# Patient Record
Sex: FEMALE | Race: WHITE | NOT HISPANIC OR LATINO | Employment: FULL TIME
[De-identification: names, ages, dates, MRNs, and addresses within clinical notes are randomized per-mention and may not be internally consistent; named-entity substitution may affect disease eponyms.]

---

## 2017-11-17 ENCOUNTER — HOSPITAL (OUTPATIENT)
Dept: OTHER | Age: 32
End: 2017-11-17

## 2018-11-17 ENCOUNTER — IMAGING SERVICES (OUTPATIENT)
Dept: OTHER | Age: 33
End: 2018-11-17

## 2018-11-17 ENCOUNTER — HOSPITAL (OUTPATIENT)
Dept: OTHER | Age: 33
End: 2018-11-17

## 2019-12-30 ENCOUNTER — APPOINTMENT (OUTPATIENT)
Dept: GENERAL RADIOLOGY | Age: 34
End: 2019-12-30
Attending: EMERGENCY MEDICINE
Payer: COMMERCIAL

## 2019-12-30 ENCOUNTER — HOSPITAL ENCOUNTER (OUTPATIENT)
Age: 34
Discharge: HOME | End: 2019-12-30
Attending: EMERGENCY MEDICINE
Payer: COMMERCIAL

## 2019-12-30 VITALS
SYSTOLIC BLOOD PRESSURE: 139 MMHG | RESPIRATION RATE: 19 BRPM | OXYGEN SATURATION: 99 % | TEMPERATURE: 99 F | DIASTOLIC BLOOD PRESSURE: 97 MMHG | HEART RATE: 103 BPM
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
## 2019-12-30 PROCEDURE — 73522 X-RAY EXAM HIPS BI 3-4 VIEWS: CPT | Performed by: EMERGENCY MEDICINE

## 2019-12-30 PROCEDURE — 73030 X-RAY EXAM OF SHOULDER: CPT | Performed by: EMERGENCY MEDICINE

## 2019-12-30 PROCEDURE — 99204 OFFICE O/P NEW MOD 45 MIN: CPT

## 2019-12-30 RX ORDER — HYDROCODONE BITARTRATE AND ACETAMINOPHEN 5; 325 MG/1; MG/1
1 TABLET ORAL EVERY 6 HOURS PRN
Qty: 10 TABLET | Refills: 0 | Status: SHIPPED | OUTPATIENT
Start: 2019-12-30 | End: 2020-01-06

## 2019-12-30 RX ORDER — DIAZEPAM 2 MG/1
2 TABLET ORAL ONCE
Status: DISCONTINUED | OUTPATIENT
Start: 2019-12-30 | End: 2019-12-30

## 2019-12-30 RX ORDER — LIDOCAINE 50 MG/G
1 PATCH TOPICAL EVERY 24 HOURS
Qty: 5 PATCH | Refills: 0 | Status: SHIPPED | OUTPATIENT
Start: 2019-12-30

## 2019-12-30 RX ORDER — HYDROCODONE BITARTRATE AND ACETAMINOPHEN 5; 325 MG/1; MG/1
1 TABLET ORAL ONCE
Status: COMPLETED | OUTPATIENT
Start: 2019-12-30 | End: 2019-12-30

## 2019-12-30 RX ORDER — DIAZEPAM 5 MG/1
2.5 TABLET ORAL ONCE
Status: COMPLETED | OUTPATIENT
Start: 2019-12-30 | End: 2019-12-30
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
## 2020-06-12 ENCOUNTER — HOSPITAL (OUTPATIENT)
Dept: OTHER | Age: 35
End: 2020-06-12
Attending: OBSTETRICS & GYNECOLOGY

## 2021-08-30 ENCOUNTER — OFFICE VISIT (OUTPATIENT)
Dept: URGENT CARE | Age: 36
End: 2021-08-30

## 2021-08-30 DIAGNOSIS — Z01.812 ENCOUNTER FOR SCREENING LABORATORY TESTING FOR COVID-19 VIRUS IN ASYMPTOMATIC PATIENT: Primary | ICD-10-CM

## 2021-08-30 DIAGNOSIS — Z11.52 ENCOUNTER FOR SCREENING LABORATORY TESTING FOR COVID-19 VIRUS IN ASYMPTOMATIC PATIENT: Primary | ICD-10-CM

## 2021-08-30 PROCEDURE — X1094 NO CHARGE VISIT: HCPCS | Performed by: NURSE PRACTITIONER

## 2021-08-30 PROCEDURE — U0003 INFECTIOUS AGENT DETECTION BY NUCLEIC ACID (DNA OR RNA); SEVERE ACUTE RESPIRATORY SYNDROME CORONAVIRUS 2 (SARS-COV-2) (CORONAVIRUS DISEASE [COVID-19]), AMPLIFIED PROBE TECHNIQUE, MAKING USE OF HIGH THROUGHPUT TECHNOLOGIES AS DESCRIBED BY CMS-2020-01-R: HCPCS | Performed by: PSYCHIATRY & NEUROLOGY

## 2021-08-30 PROCEDURE — U0005 INFEC AGEN DETEC AMPLI PROBE: HCPCS | Performed by: PSYCHIATRY & NEUROLOGY

## 2021-08-31 LAB
SARS-COV-2 RNA RESP QL NAA+PROBE: NOT DETECTED
SERVICE CMNT-IMP: NORMAL
SERVICE CMNT-IMP: NORMAL

## 2021-09-09 ENCOUNTER — OFFICE VISIT (OUTPATIENT)
Dept: URGENT CARE | Age: 36
End: 2021-09-09

## 2021-09-09 DIAGNOSIS — Z11.52 ENCOUNTER FOR SCREENING LABORATORY TESTING FOR COVID-19 VIRUS IN ASYMPTOMATIC PATIENT: Primary | ICD-10-CM

## 2021-09-09 DIAGNOSIS — Z01.812 ENCOUNTER FOR SCREENING LABORATORY TESTING FOR COVID-19 VIRUS IN ASYMPTOMATIC PATIENT: Primary | ICD-10-CM

## 2021-09-09 PROCEDURE — U0005 INFEC AGEN DETEC AMPLI PROBE: HCPCS | Performed by: INTERNAL MEDICINE

## 2021-09-09 PROCEDURE — X1094 NO CHARGE VISIT: HCPCS | Performed by: NURSE PRACTITIONER

## 2021-09-09 PROCEDURE — U0003 INFECTIOUS AGENT DETECTION BY NUCLEIC ACID (DNA OR RNA); SEVERE ACUTE RESPIRATORY SYNDROME CORONAVIRUS 2 (SARS-COV-2) (CORONAVIRUS DISEASE [COVID-19]), AMPLIFIED PROBE TECHNIQUE, MAKING USE OF HIGH THROUGHPUT TECHNOLOGIES AS DESCRIBED BY CMS-2020-01-R: HCPCS | Performed by: INTERNAL MEDICINE

## 2021-09-10 LAB
SARS-COV-2 RNA RESP QL NAA+PROBE: NOT DETECTED
SERVICE CMNT-IMP: NORMAL
SERVICE CMNT-IMP: NORMAL

## 2021-09-16 ENCOUNTER — LAB SERVICES (OUTPATIENT)
Dept: URGENT CARE | Age: 36
End: 2021-09-16

## 2021-09-16 DIAGNOSIS — Z11.52 ENCOUNTER FOR SCREENING LABORATORY TESTING FOR COVID-19 VIRUS IN ASYMPTOMATIC PATIENT: Primary | ICD-10-CM

## 2021-09-16 DIAGNOSIS — Z01.812 ENCOUNTER FOR SCREENING LABORATORY TESTING FOR COVID-19 VIRUS IN ASYMPTOMATIC PATIENT: Primary | ICD-10-CM

## 2021-09-16 PROCEDURE — U0005 INFEC AGEN DETEC AMPLI PROBE: HCPCS | Performed by: INTERNAL MEDICINE

## 2021-09-16 PROCEDURE — U0003 INFECTIOUS AGENT DETECTION BY NUCLEIC ACID (DNA OR RNA); SEVERE ACUTE RESPIRATORY SYNDROME CORONAVIRUS 2 (SARS-COV-2) (CORONAVIRUS DISEASE [COVID-19]), AMPLIFIED PROBE TECHNIQUE, MAKING USE OF HIGH THROUGHPUT TECHNOLOGIES AS DESCRIBED BY CMS-2020-01-R: HCPCS | Performed by: INTERNAL MEDICINE

## 2021-09-17 LAB
SARS-COV-2 RNA RESP QL NAA+PROBE: NOT DETECTED
SERVICE CMNT-IMP: NORMAL
SERVICE CMNT-IMP: NORMAL

## 2021-09-23 ENCOUNTER — LAB SERVICES (OUTPATIENT)
Dept: URGENT CARE | Age: 36
End: 2021-09-23

## 2021-09-23 DIAGNOSIS — Z11.52 ENCOUNTER FOR SCREENING LABORATORY TESTING FOR COVID-19 VIRUS IN ASYMPTOMATIC PATIENT: Primary | ICD-10-CM

## 2021-09-23 DIAGNOSIS — Z01.812 ENCOUNTER FOR SCREENING LABORATORY TESTING FOR COVID-19 VIRUS IN ASYMPTOMATIC PATIENT: Primary | ICD-10-CM

## 2021-09-23 PROCEDURE — U0005 INFEC AGEN DETEC AMPLI PROBE: HCPCS | Performed by: INTERNAL MEDICINE

## 2021-09-23 PROCEDURE — U0003 INFECTIOUS AGENT DETECTION BY NUCLEIC ACID (DNA OR RNA); SEVERE ACUTE RESPIRATORY SYNDROME CORONAVIRUS 2 (SARS-COV-2) (CORONAVIRUS DISEASE [COVID-19]), AMPLIFIED PROBE TECHNIQUE, MAKING USE OF HIGH THROUGHPUT TECHNOLOGIES AS DESCRIBED BY CMS-2020-01-R: HCPCS | Performed by: INTERNAL MEDICINE

## 2021-09-24 LAB
SARS-COV-2 RNA RESP QL NAA+PROBE: NOT DETECTED
SERVICE CMNT-IMP: NORMAL
SERVICE CMNT-IMP: NORMAL

## 2021-11-12 ENCOUNTER — APPOINTMENT (OUTPATIENT)
Dept: MAMMOGRAPHY | Age: 36
End: 2021-11-12
Attending: OBSTETRICS & GYNECOLOGY

## 2022-03-25 ENCOUNTER — HOSPITAL ENCOUNTER (OUTPATIENT)
Dept: MAMMOGRAPHY | Age: 37
Discharge: HOME | End: 2022-03-25
Attending: OBSTETRICS & GYNECOLOGY

## 2022-03-25 DIAGNOSIS — Z12.31 SCREENING MAMMOGRAM, ENCOUNTER FOR: ICD-10-CM

## 2022-03-25 DIAGNOSIS — R92.2 DENSE BREASTS: ICD-10-CM

## 2022-03-25 DIAGNOSIS — R92.30 DENSE BREASTS: ICD-10-CM

## 2022-03-25 PROCEDURE — 76641 ULTRASOUND BREAST COMPLETE: CPT

## 2022-03-25 PROCEDURE — 77067 SCR MAMMO BI INCL CAD: CPT

## 2022-06-28 ENCOUNTER — WALK IN (OUTPATIENT)
Dept: URGENT CARE | Age: 37
End: 2022-06-28

## 2022-06-28 VITALS
WEIGHT: 213 LBS | OXYGEN SATURATION: 100 % | RESPIRATION RATE: 18 BRPM | HEART RATE: 105 BPM | DIASTOLIC BLOOD PRESSURE: 86 MMHG | TEMPERATURE: 99 F | SYSTOLIC BLOOD PRESSURE: 136 MMHG | BODY MASS INDEX: 41.82 KG/M2 | HEIGHT: 60 IN

## 2022-06-28 DIAGNOSIS — J02.0 PHARYNGITIS, STREPTOCOCCAL, ACUTE: Primary | ICD-10-CM

## 2022-06-28 LAB
INTERNAL PROCEDURAL CONTROLS ACCEPTABLE: YES
INTERNAL PROCEDURAL CONTROLS ACCEPTABLE: YES
S PYO AG THROAT QL IA.RAPID: POSITIVE
SARS-COV+SARS-COV-2 AG RESP QL IA.RAPID: NOT DETECTED

## 2022-06-28 PROCEDURE — 99213 OFFICE O/P EST LOW 20 MIN: CPT | Performed by: NURSE PRACTITIONER

## 2022-06-28 PROCEDURE — 87880 STREP A ASSAY W/OPTIC: CPT | Performed by: NURSE PRACTITIONER

## 2022-06-28 PROCEDURE — 87426 SARSCOV CORONAVIRUS AG IA: CPT | Performed by: NURSE PRACTITIONER

## 2022-06-28 RX ORDER — SEMAGLUTIDE 1.34 MG/ML
0.5 INJECTION, SOLUTION SUBCUTANEOUS
Status: ON HOLD | COMMUNITY
Start: 2022-05-04 | End: 2023-03-02

## 2022-06-28 RX ORDER — AMOXICILLIN 500 MG/1
500 CAPSULE ORAL 2 TIMES DAILY
Qty: 20 CAPSULE | Refills: 0 | Status: SHIPPED | OUTPATIENT
Start: 2022-06-28 | End: 2022-07-08

## 2022-06-28 RX ORDER — METRONIDAZOLE 7.5 MG/G
GEL VAGINAL
Status: ON HOLD | COMMUNITY
Start: 2022-03-24 | End: 2023-03-02

## 2022-06-28 RX ORDER — ATENOLOL 25 MG/1
25 TABLET ORAL DAILY
Status: ON HOLD | COMMUNITY
Start: 2022-03-22 | End: 2023-03-02

## 2023-03-02 ENCOUNTER — APPOINTMENT (OUTPATIENT)
Dept: MRI IMAGING | Age: 38
DRG: 444 | End: 2023-03-02
Attending: INTERNAL MEDICINE

## 2023-03-02 ENCOUNTER — HOSPITAL ENCOUNTER (INPATIENT)
Age: 38
LOS: 3 days | Discharge: HOME | DRG: 444 | End: 2023-03-05
Attending: EMERGENCY MEDICINE | Admitting: INTERNAL MEDICINE

## 2023-03-02 ENCOUNTER — APPOINTMENT (OUTPATIENT)
Dept: ULTRASOUND IMAGING | Age: 38
DRG: 444 | End: 2023-03-02
Attending: PHYSICIAN ASSISTANT

## 2023-03-02 DIAGNOSIS — K80.50 CHOLEDOCHOLITHIASIS: Primary | ICD-10-CM

## 2023-03-02 LAB
ALBUMIN SERPL-MCNC: 3.9 G/DL (ref 3.6–5.1)
ALBUMIN/GLOB SERPL: 0.9 {RATIO} (ref 1–2.4)
ALP SERPL-CCNC: 239 UNITS/L (ref 45–117)
ALT SERPL-CCNC: 503 UNITS/L
AMORPH SED URNS QL MICRO: PRESENT
ANION GAP SERPL CALC-SCNC: 5 MMOL/L (ref 7–19)
APPEARANCE UR: ABNORMAL
AST SERPL-CCNC: 741 UNITS/L
B-HCG UR QL: NEGATIVE
BACTERIA #/AREA URNS HPF: ABNORMAL /HPF
BASOPHILS # BLD: 0.1 K/MCL (ref 0–0.3)
BASOPHILS NFR BLD: 1 %
BILIRUB SERPL-MCNC: 0.9 MG/DL (ref 0.2–1)
BILIRUB UR QL STRIP: NEGATIVE
BUN SERPL-MCNC: 16 MG/DL (ref 6–20)
BUN/CREAT SERPL: 23 (ref 7–25)
CALCIUM SERPL-MCNC: 9.6 MG/DL (ref 8.4–10.2)
CHLORIDE SERPL-SCNC: 108 MMOL/L (ref 97–110)
CO2 SERPL-SCNC: 28 MMOL/L (ref 21–32)
COLOR UR: YELLOW
CREAT SERPL-MCNC: 0.7 MG/DL (ref 0.51–0.95)
DEPRECATED RDW RBC: 40.2 FL (ref 39–50)
EOSINOPHIL # BLD: 0.1 K/MCL (ref 0–0.5)
EOSINOPHIL NFR BLD: 2 %
ERYTHROCYTE [DISTWIDTH] IN BLOOD: 13.5 % (ref 11–15)
FASTING DURATION TIME PATIENT: ABNORMAL H
GFR SERPLBLD BASED ON 1.73 SQ M-ARVRAT: >90 ML/MIN
GLOBULIN SER-MCNC: 4.4 G/DL (ref 2–4)
GLUCOSE SERPL-MCNC: 129 MG/DL (ref 70–99)
GLUCOSE UR STRIP-MCNC: NEGATIVE MG/DL
HCT VFR BLD CALC: 42.7 % (ref 36–46.5)
HGB BLD-MCNC: 14 G/DL (ref 12–15.5)
HGB UR QL STRIP: NEGATIVE
HYALINE CASTS #/AREA URNS LPF: ABNORMAL /LPF
IMM GRANULOCYTES # BLD AUTO: 0 K/MCL (ref 0–0.2)
IMM GRANULOCYTES # BLD: 0 %
KETONES UR STRIP-MCNC: NEGATIVE MG/DL
LEUKOCYTE ESTERASE UR QL STRIP: NEGATIVE
LIPASE SERPL-CCNC: 286 UNITS/L (ref 73–393)
LYMPHOCYTES # BLD: 1.4 K/MCL (ref 1–4.8)
LYMPHOCYTES NFR BLD: 19 %
MCH RBC QN AUTO: 26.9 PG (ref 26–34)
MCHC RBC AUTO-ENTMCNC: 32.8 G/DL (ref 32–36.5)
MCV RBC AUTO: 82 FL (ref 78–100)
MONOCYTES # BLD: 0.5 K/MCL (ref 0.3–0.9)
MONOCYTES NFR BLD: 7 %
MUCOUS THREADS URNS QL MICRO: PRESENT
NEUTROPHILS # BLD: 5.4 K/MCL (ref 1.8–7.7)
NEUTROPHILS NFR BLD: 71 %
NITRITE UR QL STRIP: NEGATIVE
NRBC BLD MANUAL-RTO: 0 /100 WBC
PH UR STRIP: 7.5 [PH] (ref 5–7)
PLATELET # BLD AUTO: 280 K/MCL (ref 140–450)
POTASSIUM SERPL-SCNC: 3.9 MMOL/L (ref 3.4–5.1)
PROCALCITONIN SERPL IA-MCNC: <0.05 NG/ML
PROT SERPL-MCNC: 8.3 G/DL (ref 6.4–8.2)
PROT UR STRIP-MCNC: 100 MG/DL
RBC # BLD: 5.21 MIL/MCL (ref 4–5.2)
RBC #/AREA URNS HPF: ABNORMAL /HPF
SODIUM SERPL-SCNC: 137 MMOL/L (ref 135–145)
SP GR UR STRIP: 1.02 (ref 1–1.03)
SQUAMOUS #/AREA URNS HPF: ABNORMAL /HPF
TROPONIN I SERPL DL<=0.01 NG/ML-MCNC: 5 NG/L
UROBILINOGEN UR STRIP-MCNC: 0.2 MG/DL
WBC # BLD: 7.5 K/MCL (ref 4.2–11)
WBC #/AREA URNS HPF: ABNORMAL /HPF

## 2023-03-02 PROCEDURE — 80053 COMPREHEN METABOLIC PANEL: CPT | Performed by: EMERGENCY MEDICINE

## 2023-03-02 PROCEDURE — 99254 IP/OBS CNSLTJ NEW/EST MOD 60: CPT | Performed by: INTERNAL MEDICINE

## 2023-03-02 PROCEDURE — 96374 THER/PROPH/DIAG INJ IV PUSH: CPT

## 2023-03-02 PROCEDURE — 10002800 HB RX 250 W HCPCS: Performed by: INTERNAL MEDICINE

## 2023-03-02 PROCEDURE — 93010 ELECTROCARDIOGRAM REPORT: CPT | Performed by: INTERNAL MEDICINE

## 2023-03-02 PROCEDURE — 85025 COMPLETE CBC W/AUTO DIFF WBC: CPT | Performed by: EMERGENCY MEDICINE

## 2023-03-02 PROCEDURE — G1004 CDSM NDSC: HCPCS

## 2023-03-02 PROCEDURE — 74181 MRI ABDOMEN W/O CONTRAST: CPT

## 2023-03-02 PROCEDURE — 10002807 HB RX 258: Performed by: INTERNAL MEDICINE

## 2023-03-02 PROCEDURE — 10002800 HB RX 250 W HCPCS: Performed by: PHYSICIAN ASSISTANT

## 2023-03-02 PROCEDURE — 81025 URINE PREGNANCY TEST: CPT | Performed by: EMERGENCY MEDICINE

## 2023-03-02 PROCEDURE — 83690 ASSAY OF LIPASE: CPT | Performed by: EMERGENCY MEDICINE

## 2023-03-02 PROCEDURE — 10002800 HB RX 250 W HCPCS

## 2023-03-02 PROCEDURE — 84484 ASSAY OF TROPONIN QUANT: CPT | Performed by: EMERGENCY MEDICINE

## 2023-03-02 PROCEDURE — 76705 ECHO EXAM OF ABDOMEN: CPT

## 2023-03-02 PROCEDURE — 10002803 HB RX 637: Performed by: INTERNAL MEDICINE

## 2023-03-02 PROCEDURE — 10002807 HB RX 258: Performed by: PHYSICIAN ASSISTANT

## 2023-03-02 PROCEDURE — 10002800 HB RX 250 W HCPCS: Performed by: EMERGENCY MEDICINE

## 2023-03-02 PROCEDURE — 96361 HYDRATE IV INFUSION ADD-ON: CPT

## 2023-03-02 PROCEDURE — 81001 URINALYSIS AUTO W/SCOPE: CPT | Performed by: EMERGENCY MEDICINE

## 2023-03-02 PROCEDURE — 10000002 HB ROOM CHARGE MED SURG

## 2023-03-02 PROCEDURE — 10004651 HB RX, NO CHARGE ITEM: Performed by: INTERNAL MEDICINE

## 2023-03-02 PROCEDURE — 0FC98ZZ EXTIRPATION OF MATTER FROM COMMON BILE DUCT, VIA NATURAL OR ARTIFICIAL OPENING ENDOSCOPIC: ICD-10-PCS | Performed by: INTERNAL MEDICINE

## 2023-03-02 PROCEDURE — 99285 EMERGENCY DEPT VISIT HI MDM: CPT | Performed by: EMERGENCY MEDICINE

## 2023-03-02 PROCEDURE — 96375 TX/PRO/DX INJ NEW DRUG ADDON: CPT

## 2023-03-02 PROCEDURE — 84145 PROCALCITONIN (PCT): CPT | Performed by: INTERNAL MEDICINE

## 2023-03-02 PROCEDURE — 99285 EMERGENCY DEPT VISIT HI MDM: CPT

## 2023-03-02 PROCEDURE — 93005 ELECTROCARDIOGRAM TRACING: CPT | Performed by: EMERGENCY MEDICINE

## 2023-03-02 RX ORDER — MEDROXYPROGESTERONE ACETATE 150 MG/ML
150 INJECTION, SUSPENSION INTRAMUSCULAR
COMMUNITY

## 2023-03-02 RX ORDER — ACETAMINOPHEN 325 MG/1
650 TABLET ORAL EVERY 4 HOURS PRN
Status: DISCONTINUED | OUTPATIENT
Start: 2023-03-02 | End: 2023-03-05 | Stop reason: HOSPADM

## 2023-03-02 RX ORDER — ONDANSETRON 2 MG/ML
4 INJECTION INTRAMUSCULAR; INTRAVENOUS EVERY 8 HOURS PRN
Status: DISCONTINUED | OUTPATIENT
Start: 2023-03-02 | End: 2023-03-05 | Stop reason: HOSPADM

## 2023-03-02 RX ORDER — SODIUM CHLORIDE, SODIUM LACTATE, POTASSIUM CHLORIDE, CALCIUM CHLORIDE 600; 310; 30; 20 MG/100ML; MG/100ML; MG/100ML; MG/100ML
INJECTION, SOLUTION INTRAVENOUS CONTINUOUS
Status: DISCONTINUED | OUTPATIENT
Start: 2023-03-02 | End: 2023-03-05 | Stop reason: HOSPADM

## 2023-03-02 RX ORDER — ALPRAZOLAM 0.25 MG/1
0.5 TABLET ORAL
Status: COMPLETED | OUTPATIENT
Start: 2023-03-02 | End: 2023-03-02

## 2023-03-02 RX ORDER — KETOROLAC TROMETHAMINE 15 MG/ML
15 INJECTION, SOLUTION INTRAMUSCULAR; INTRAVENOUS EVERY 6 HOURS PRN
Status: DISCONTINUED | OUTPATIENT
Start: 2023-03-02 | End: 2023-03-05 | Stop reason: HOSPADM

## 2023-03-02 RX ORDER — ONDANSETRON 2 MG/ML
INJECTION INTRAMUSCULAR; INTRAVENOUS
Status: COMPLETED
Start: 2023-03-02 | End: 2023-03-02

## 2023-03-02 RX ORDER — KETOROLAC TROMETHAMINE 15 MG/ML
15 INJECTION, SOLUTION INTRAMUSCULAR; INTRAVENOUS ONCE
Status: COMPLETED | OUTPATIENT
Start: 2023-03-02 | End: 2023-03-02

## 2023-03-02 RX ORDER — PANTOPRAZOLE SODIUM 40 MG/10ML
40 INJECTION, POWDER, LYOPHILIZED, FOR SOLUTION INTRAVENOUS NIGHTLY
Status: DISCONTINUED | OUTPATIENT
Start: 2023-03-03 | End: 2023-03-05 | Stop reason: HOSPADM

## 2023-03-02 RX ORDER — 0.9 % SODIUM CHLORIDE 0.9 %
2 VIAL (ML) INJECTION EVERY 12 HOURS SCHEDULED
Status: DISCONTINUED | OUTPATIENT
Start: 2023-03-02 | End: 2023-03-05 | Stop reason: HOSPADM

## 2023-03-02 RX ORDER — ENOXAPARIN SODIUM 100 MG/ML
40 INJECTION SUBCUTANEOUS DAILY
Status: DISCONTINUED | OUTPATIENT
Start: 2023-03-02 | End: 2023-03-02

## 2023-03-02 RX ADMIN — ALPRAZOLAM 0.5 MG: 0.25 TABLET ORAL at 21:57

## 2023-03-02 RX ADMIN — ONDANSETRON 4 MG: 2 INJECTION INTRAMUSCULAR; INTRAVENOUS at 10:44

## 2023-03-02 RX ADMIN — ONDANSETRON 4 MG: 2 INJECTION INTRAMUSCULAR; INTRAVENOUS at 20:09

## 2023-03-02 RX ADMIN — KETOROLAC TROMETHAMINE 15 MG: 15 INJECTION, SOLUTION INTRAMUSCULAR; INTRAVENOUS at 10:41

## 2023-03-02 RX ADMIN — SODIUM CHLORIDE 1000 ML: 9 INJECTION, SOLUTION INTRAVENOUS at 10:44

## 2023-03-02 RX ADMIN — SODIUM CHLORIDE, PRESERVATIVE FREE 2 ML: 5 INJECTION INTRAVENOUS at 20:46

## 2023-03-02 RX ADMIN — SODIUM CHLORIDE, PRESERVATIVE FREE 2 ML: 5 INJECTION INTRAVENOUS at 15:04

## 2023-03-02 RX ADMIN — MORPHINE SULFATE 4 MG: 4 INJECTION, SOLUTION INTRAMUSCULAR; INTRAVENOUS at 12:16

## 2023-03-02 RX ADMIN — HYDROMORPHONE HYDROCHLORIDE 0.4 MG: 1 INJECTION, SOLUTION INTRAMUSCULAR; INTRAVENOUS; SUBCUTANEOUS at 17:12

## 2023-03-02 RX ADMIN — SODIUM CHLORIDE, POTASSIUM CHLORIDE, SODIUM LACTATE AND CALCIUM CHLORIDE: 600; 310; 30; 20 INJECTION, SOLUTION INTRAVENOUS at 18:37

## 2023-03-02 SDOH — ECONOMIC STABILITY: FOOD INSECURITY: HOW OFTEN IN THE PAST 12 MONTHS WERE YOU WORRIED OR STRESSED ABOUT HAVING ENOUGH MONEY TO BUY NUTRITIOUS MEALS?: USUALLY

## 2023-03-02 SDOH — HEALTH STABILITY: GENERAL
BECAUSE OF A PHYSICAL, MENTAL, OR EMOTIONAL CONDITION, DO YOU HAVE SERIOUS DIFFICULTY CONCENTRATING, REMEMBERING OR MAKING DECISIONS?: NO

## 2023-03-02 SDOH — ECONOMIC STABILITY: TRANSPORTATION INSECURITY
IN THE PAST 12 MONTHS, HAS LACK OF TRANSPORTATION KEPT YOU FROM MEETINGS, WORK, OR FROM GETTING THINGS NEEDED FOR DAILY LIVING?: NO

## 2023-03-02 SDOH — SOCIAL STABILITY: SOCIAL NETWORK
HOW OFTEN DO YOU SEE OR TALK TO PEOPLE THAT YOU CARE ABOUT AND FEEL CLOSE TO? (FOR EXAMPLE: TALKING TO FRIENDS ON THE PHONE, VISITING FRIENDS OR FAMILY, GOING TO CHURCH OR CLUB MEETINGS): 5 OR MORE TIMES A WEEK

## 2023-03-02 SDOH — ECONOMIC STABILITY: TRANSPORTATION INSECURITY
IN THE PAST 12 MONTHS, HAS THE LACK OF TRANSPORTATION KEPT YOU FROM MEDICAL APPOINTMENTS OR FROM GETTING MEDICATIONS?: NO

## 2023-03-02 SDOH — HEALTH STABILITY: GENERAL: BECAUSE OF A PHYSICAL, MENTAL, OR EMOTIONAL CONDITION, DO YOU HAVE DIFFICULTY DOING ERRANDS ALONE?: NO

## 2023-03-02 SDOH — HEALTH STABILITY: PHYSICAL HEALTH: DO YOU HAVE SERIOUS DIFFICULTY WALKING OR CLIMBING STAIRS?: NO

## 2023-03-02 SDOH — SOCIAL STABILITY: SOCIAL NETWORK: SUPPORT SYSTEMS: CHILDREN;PARENT;FAMILY MEMBERS

## 2023-03-02 SDOH — ECONOMIC STABILITY: HOUSING INSECURITY: WHAT IS YOUR LIVING SITUATION TODAY?: HOUSE

## 2023-03-02 SDOH — ECONOMIC STABILITY: HOUSING INSECURITY: ARE YOU WORRIED ABOUT LOSING YOUR HOUSING?: NO

## 2023-03-02 SDOH — ECONOMIC STABILITY: HOUSING INSECURITY: WHAT IS YOUR LIVING SITUATION TODAY?: CHILDREN

## 2023-03-02 SDOH — HEALTH STABILITY: PHYSICAL HEALTH: DO YOU HAVE DIFFICULTY DRESSING OR BATHING?: NO

## 2023-03-02 ASSESSMENT — PATIENT HEALTH QUESTIONNAIRE - PHQ9
2. FEELING DOWN, DEPRESSED OR HOPELESS: NOT AT ALL
SUM OF ALL RESPONSES TO PHQ9 QUESTIONS 1 AND 2: 0
1. LITTLE INTEREST OR PLEASURE IN DOING THINGS: NOT AT ALL
CLINICAL INTERPRETATION OF PHQ2 SCORE: NO FURTHER SCREENING NEEDED
IS PATIENT ABLE TO COMPLETE PHQ2 OR PHQ9: YES
SUM OF ALL RESPONSES TO PHQ9 QUESTIONS 1 AND 2: 0

## 2023-03-02 ASSESSMENT — COLUMBIA-SUICIDE SEVERITY RATING SCALE - C-SSRS
1. WITHIN THE PAST MONTH, HAVE YOU WISHED YOU WERE DEAD OR WISHED YOU COULD GO TO SLEEP AND NOT WAKE UP?: NO
IS THE PATIENT ABLE TO COMPLETE C-SSRS: YES
2. HAVE YOU ACTUALLY HAD ANY THOUGHTS OF KILLING YOURSELF?: NO
6. HAVE YOU EVER DONE ANYTHING, STARTED TO DO ANYTHING, OR PREPARED TO DO ANYTHING TO END YOUR LIFE?: NO

## 2023-03-02 ASSESSMENT — LIFESTYLE VARIABLES
AUDIT-C TOTAL SCORE: 0
HOW OFTEN DO YOU HAVE A DRINK CONTAINING ALCOHOL: NEVER
ALCOHOL_USE_STATUS: NO OR LOW RISK WITH VALIDATED TOOL
HOW OFTEN DO YOU HAVE 6 OR MORE DRINKS ON ONE OCCASION: NEVER
HOW MANY STANDARD DRINKS CONTAINING ALCOHOL DO YOU HAVE ON A TYPICAL DAY: 0,1 OR 2

## 2023-03-02 ASSESSMENT — ACTIVITIES OF DAILY LIVING (ADL)
ADL_BEFORE_ADMISSION: INDEPENDENT
RECENT_DECLINE_ADL: NO
ADL_SCORE: 12

## 2023-03-02 ASSESSMENT — PAIN SCALES - GENERAL
PAINLEVEL_OUTOF10: 5
PAINLEVEL_OUTOF10: 10
PAINLEVEL_OUTOF10: 4
PAINLEVEL_OUTOF10: 7
PAINLEVEL_OUTOF10: 7
PAINLEVEL_OUTOF10: 0

## 2023-03-03 ENCOUNTER — APPOINTMENT (OUTPATIENT)
Dept: GASTROENTEROLOGY | Age: 38
DRG: 444 | End: 2023-03-03
Attending: INTERNAL MEDICINE

## 2023-03-03 ENCOUNTER — ANESTHESIA (OUTPATIENT)
Dept: GASTROENTEROLOGY | Age: 38
DRG: 444 | End: 2023-03-03

## 2023-03-03 ENCOUNTER — ANESTHESIA EVENT (OUTPATIENT)
Dept: GASTROENTEROLOGY | Age: 38
DRG: 444 | End: 2023-03-03

## 2023-03-03 LAB
ALBUMIN SERPL-MCNC: 3.4 G/DL (ref 3.6–5.1)
ALBUMIN/GLOB SERPL: 0.9 {RATIO} (ref 1–2.4)
ALP SERPL-CCNC: 249 UNITS/L (ref 45–117)
ALT SERPL-CCNC: 1295 UNITS/L
AMYLASE SERPL-CCNC: 71 UNITS/L (ref 25–115)
ANION GAP SERPL CALC-SCNC: 8 MMOL/L (ref 7–19)
AST SERPL-CCNC: 888 UNITS/L
ATRIAL RATE (BPM): 96
BASOPHILS # BLD: 0 K/MCL (ref 0–0.3)
BASOPHILS NFR BLD: 1 %
BILIRUB SERPL-MCNC: 1.5 MG/DL (ref 0.2–1)
BUN SERPL-MCNC: 9 MG/DL (ref 6–20)
BUN/CREAT SERPL: 15 (ref 7–25)
CALCIUM SERPL-MCNC: 9.1 MG/DL (ref 8.4–10.2)
CHLORIDE SERPL-SCNC: 108 MMOL/L (ref 97–110)
CO2 SERPL-SCNC: 26 MMOL/L (ref 21–32)
CREAT SERPL-MCNC: 0.59 MG/DL (ref 0.51–0.95)
CRP SERPL-MCNC: 0.5 MG/DL
DEPRECATED RDW RBC: 41.1 FL (ref 39–50)
EOSINOPHIL # BLD: 0.1 K/MCL (ref 0–0.5)
EOSINOPHIL NFR BLD: 1 %
ERYTHROCYTE [DISTWIDTH] IN BLOOD: 13.6 % (ref 11–15)
FASTING DURATION TIME PATIENT: ABNORMAL H
GFR SERPLBLD BASED ON 1.73 SQ M-ARVRAT: >90 ML/MIN
GLOBULIN SER-MCNC: 4 G/DL (ref 2–4)
GLUCOSE SERPL-MCNC: 100 MG/DL (ref 70–99)
HCT VFR BLD CALC: 40.9 % (ref 36–46.5)
HGB BLD-MCNC: 13.2 G/DL (ref 12–15.5)
IMM GRANULOCYTES # BLD AUTO: 0 K/MCL (ref 0–0.2)
IMM GRANULOCYTES # BLD: 0 %
LIPASE SERPL-CCNC: 484 UNITS/L (ref 73–393)
LYMPHOCYTES # BLD: 1.5 K/MCL (ref 1–4.8)
LYMPHOCYTES NFR BLD: 19 %
MAGNESIUM SERPL-MCNC: 2.2 MG/DL (ref 1.7–2.4)
MCH RBC QN AUTO: 26.9 PG (ref 26–34)
MCHC RBC AUTO-ENTMCNC: 32.3 G/DL (ref 32–36.5)
MCV RBC AUTO: 83.5 FL (ref 78–100)
MONOCYTES # BLD: 0.6 K/MCL (ref 0.3–0.9)
MONOCYTES NFR BLD: 7 %
NEUTROPHILS # BLD: 5.7 K/MCL (ref 1.8–7.7)
NEUTROPHILS NFR BLD: 72 %
NRBC BLD MANUAL-RTO: 0 /100 WBC
P AXIS (DEGREES): 34
PHOSPHATE SERPL-MCNC: 3.6 MG/DL (ref 2.4–4.7)
PLATELET # BLD AUTO: 243 K/MCL (ref 140–450)
POTASSIUM SERPL-SCNC: 3.8 MMOL/L (ref 3.4–5.1)
PR-INTERVAL (MSEC): 104
PROT SERPL-MCNC: 7.4 G/DL (ref 6.4–8.2)
QRS-INTERVAL (MSEC): 87
QT-INTERVAL (MSEC): 328
QTC: 413
R AXIS (DEGREES): 88
RBC # BLD: 4.9 MIL/MCL (ref 4–5.2)
REPORT TEXT: NORMAL
SODIUM SERPL-SCNC: 138 MMOL/L (ref 135–145)
T AXIS (DEGREES): -18
VENTRICULAR RATE EKG/MIN (BPM): 95
WBC # BLD: 7.9 K/MCL (ref 4.2–11)

## 2023-03-03 PROCEDURE — 43262 ENDO CHOLANGIOPANCREATOGRAPH: CPT | Performed by: INTERNAL MEDICINE

## 2023-03-03 PROCEDURE — 85025 COMPLETE CBC W/AUTO DIFF WBC: CPT | Performed by: INTERNAL MEDICINE

## 2023-03-03 PROCEDURE — 13000001 HB PHASE II RECOVERY EA 30 MINUTES

## 2023-03-03 PROCEDURE — 82150 ASSAY OF AMYLASE: CPT | Performed by: SURGERY

## 2023-03-03 PROCEDURE — 84100 ASSAY OF PHOSPHORUS: CPT | Performed by: SURGERY

## 2023-03-03 PROCEDURE — 10002800 HB RX 250 W HCPCS: Performed by: ANESTHESIOLOGY

## 2023-03-03 PROCEDURE — 10004651 HB RX, NO CHARGE ITEM: Performed by: INTERNAL MEDICINE

## 2023-03-03 PROCEDURE — 10002800 HB RX 250 W HCPCS: Performed by: INTERNAL MEDICINE

## 2023-03-03 PROCEDURE — 10002807 HB RX 258: Performed by: INTERNAL MEDICINE

## 2023-03-03 PROCEDURE — C1769 GUIDE WIRE: HCPCS

## 2023-03-03 PROCEDURE — 10002805 HB CONTRAST AGENT: Performed by: INTERNAL MEDICINE

## 2023-03-03 PROCEDURE — C9113 INJ PANTOPRAZOLE SODIUM, VIA: HCPCS | Performed by: INTERNAL MEDICINE

## 2023-03-03 PROCEDURE — 13000029 HB GI MAJOR COMPLEX CASE EA ADD MINUTE

## 2023-03-03 PROCEDURE — 10006023 HB SUPPLY 272

## 2023-03-03 PROCEDURE — 10000002 HB ROOM CHARGE MED SURG

## 2023-03-03 PROCEDURE — 80053 COMPREHEN METABOLIC PANEL: CPT | Performed by: INTERNAL MEDICINE

## 2023-03-03 PROCEDURE — 99233 SBSQ HOSP IP/OBS HIGH 50: CPT | Performed by: INTERNAL MEDICINE

## 2023-03-03 PROCEDURE — 74328 X-RAY BILE DUCT ENDOSCOPY: CPT | Performed by: INTERNAL MEDICINE

## 2023-03-03 PROCEDURE — 13000028 HB GI MAJOR COMPLEX CASE S/U + 1ST 15 MIN

## 2023-03-03 PROCEDURE — 43264 ERCP REMOVE DUCT CALCULI: CPT | Performed by: INTERNAL MEDICINE

## 2023-03-03 PROCEDURE — 13000004 HB  ANESTHESIA  GENERAL OUTSIDE OR

## 2023-03-03 PROCEDURE — 83690 ASSAY OF LIPASE: CPT | Performed by: SURGERY

## 2023-03-03 PROCEDURE — 10002801 HB RX 250 W/O HCPCS: Performed by: ANESTHESIOLOGY

## 2023-03-03 PROCEDURE — 36415 COLL VENOUS BLD VENIPUNCTURE: CPT | Performed by: SURGERY

## 2023-03-03 PROCEDURE — 83735 ASSAY OF MAGNESIUM: CPT | Performed by: SURGERY

## 2023-03-03 PROCEDURE — 10002807 HB RX 258: Performed by: ANESTHESIOLOGY

## 2023-03-03 PROCEDURE — 86140 C-REACTIVE PROTEIN: CPT | Performed by: SURGERY

## 2023-03-03 RX ORDER — ONDANSETRON 2 MG/ML
INJECTION INTRAMUSCULAR; INTRAVENOUS PRN
Status: DISCONTINUED | OUTPATIENT
Start: 2023-03-03 | End: 2023-03-03

## 2023-03-03 RX ORDER — LIDOCAINE HYDROCHLORIDE 10 MG/ML
INJECTION, SOLUTION INFILTRATION; PERINEURAL PRN
Status: DISCONTINUED | OUTPATIENT
Start: 2023-03-03 | End: 2023-03-03

## 2023-03-03 RX ORDER — PROPOFOL 10 MG/ML
INJECTION, EMULSION INTRAVENOUS PRN
Status: DISCONTINUED | OUTPATIENT
Start: 2023-03-03 | End: 2023-03-03

## 2023-03-03 RX ORDER — SODIUM CHLORIDE, SODIUM LACTATE, POTASSIUM CHLORIDE, CALCIUM CHLORIDE 600; 310; 30; 20 MG/100ML; MG/100ML; MG/100ML; MG/100ML
INJECTION, SOLUTION INTRAVENOUS CONTINUOUS PRN
Status: DISCONTINUED | OUTPATIENT
Start: 2023-03-03 | End: 2023-03-03

## 2023-03-03 RX ORDER — SODIUM CHLORIDE, SODIUM LACTATE, POTASSIUM CHLORIDE, CALCIUM CHLORIDE 600; 310; 30; 20 MG/100ML; MG/100ML; MG/100ML; MG/100ML
INJECTION, SOLUTION INTRAVENOUS CONTINUOUS
Status: DISCONTINUED | OUTPATIENT
Start: 2023-03-03 | End: 2023-03-03

## 2023-03-03 RX ORDER — DEXAMETHASONE SODIUM PHOSPHATE 4 MG/ML
INJECTION, SOLUTION INTRA-ARTICULAR; INTRALESIONAL; INTRAMUSCULAR; INTRAVENOUS; SOFT TISSUE PRN
Status: DISCONTINUED | OUTPATIENT
Start: 2023-03-03 | End: 2023-03-03

## 2023-03-03 RX ADMIN — LIDOCAINE HYDROCHLORIDE 50 MG: 10 INJECTION, SOLUTION INFILTRATION; PERINEURAL at 16:22

## 2023-03-03 RX ADMIN — SODIUM CHLORIDE, POTASSIUM CHLORIDE, SODIUM LACTATE AND CALCIUM CHLORIDE: 600; 310; 30; 20 INJECTION, SOLUTION INTRAVENOUS at 20:50

## 2023-03-03 RX ADMIN — FENTANYL CITRATE 50 MCG: 50 INJECTION, SOLUTION INTRAMUSCULAR; INTRAVENOUS at 16:28

## 2023-03-03 RX ADMIN — IOHEXOL 9 ML: 350 INJECTION, SOLUTION INTRAVENOUS at 16:40

## 2023-03-03 RX ADMIN — SODIUM CHLORIDE, POTASSIUM CHLORIDE, SODIUM LACTATE AND CALCIUM CHLORIDE: 600; 310; 30; 20 INJECTION, SOLUTION INTRAVENOUS at 16:22

## 2023-03-03 RX ADMIN — SODIUM CHLORIDE, POTASSIUM CHLORIDE, SODIUM LACTATE AND CALCIUM CHLORIDE: 600; 310; 30; 20 INJECTION, SOLUTION INTRAVENOUS at 05:56

## 2023-03-03 RX ADMIN — PROPOFOL 300 MG: 10 INJECTION, EMULSION INTRAVENOUS at 16:22

## 2023-03-03 RX ADMIN — ACETAMINOPHEN 650 MG: 325 TABLET ORAL at 00:14

## 2023-03-03 RX ADMIN — PANTOPRAZOLE SODIUM 40 MG: 40 INJECTION, POWDER, FOR SOLUTION INTRAVENOUS at 20:47

## 2023-03-03 RX ADMIN — ONDANSETRON 4 MG: 2 INJECTION INTRAMUSCULAR; INTRAVENOUS at 16:48

## 2023-03-03 RX ADMIN — DEXAMETHASONE SODIUM PHOSPHATE 8 MG: 4 INJECTION, SOLUTION INTRAMUSCULAR; INTRAVENOUS at 16:22

## 2023-03-03 RX ADMIN — Medication 120 MG: at 16:22

## 2023-03-03 RX ADMIN — FENTANYL CITRATE 50 MCG: 50 INJECTION, SOLUTION INTRAMUSCULAR; INTRAVENOUS at 16:22

## 2023-03-03 ASSESSMENT — PAIN SCALES - GENERAL
PAINLEVEL_OUTOF10: 0

## 2023-03-03 ASSESSMENT — COGNITIVE AND FUNCTIONAL STATUS - GENERAL
DO YOU HAVE DIFFICULTY DRESSING OR BATHING: NO
DO YOU HAVE SERIOUS DIFFICULTY WALKING OR CLIMBING STAIRS: NO
BECAUSE OF A PHYSICAL, MENTAL, OR EMOTIONAL CONDITION, DO YOU HAVE SERIOUS DIFFICULTY CONCENTRATING, REMEMBERING OR MAKING DECISIONS: NO
BECAUSE OF A PHYSICAL, MENTAL, OR EMOTIONAL CONDITION, DO YOU HAVE DIFFICULTY DOING ERRANDS ALONE: NO

## 2023-03-04 LAB
ALBUMIN SERPL-MCNC: 3.4 G/DL (ref 3.6–5.1)
ALBUMIN/GLOB SERPL: 0.8 {RATIO} (ref 1–2.4)
ALP SERPL-CCNC: 207 UNITS/L (ref 45–117)
ALT SERPL-CCNC: 768 UNITS/L
ANION GAP SERPL CALC-SCNC: 6 MMOL/L (ref 7–19)
AST SERPL-CCNC: 197 UNITS/L
BASOPHILS # BLD: 0 K/MCL (ref 0–0.3)
BASOPHILS NFR BLD: 0 %
BILIRUB SERPL-MCNC: 0.6 MG/DL (ref 0.2–1)
BUN SERPL-MCNC: 9 MG/DL (ref 6–20)
BUN/CREAT SERPL: 15 (ref 7–25)
CALCIUM SERPL-MCNC: 9.1 MG/DL (ref 8.4–10.2)
CHLORIDE SERPL-SCNC: 108 MMOL/L (ref 97–110)
CO2 SERPL-SCNC: 26 MMOL/L (ref 21–32)
CREAT SERPL-MCNC: 0.6 MG/DL (ref 0.51–0.95)
DEPRECATED RDW RBC: 40.6 FL (ref 39–50)
EOSINOPHIL # BLD: 0 K/MCL (ref 0–0.5)
EOSINOPHIL NFR BLD: 0 %
ERYTHROCYTE [DISTWIDTH] IN BLOOD: 13.3 % (ref 11–15)
FASTING DURATION TIME PATIENT: ABNORMAL H
GFR SERPLBLD BASED ON 1.73 SQ M-ARVRAT: >90 ML/MIN
GLOBULIN SER-MCNC: 4.1 G/DL (ref 2–4)
GLUCOSE SERPL-MCNC: 100 MG/DL (ref 70–99)
HCT VFR BLD CALC: 40.5 % (ref 36–46.5)
HGB BLD-MCNC: 13.2 G/DL (ref 12–15.5)
IMM GRANULOCYTES # BLD AUTO: 0.1 K/MCL (ref 0–0.2)
IMM GRANULOCYTES # BLD: 1 %
LYMPHOCYTES # BLD: 1.4 K/MCL (ref 1–4.8)
LYMPHOCYTES NFR BLD: 14 %
MCH RBC QN AUTO: 27.2 PG (ref 26–34)
MCHC RBC AUTO-ENTMCNC: 32.6 G/DL (ref 32–36.5)
MCV RBC AUTO: 83.5 FL (ref 78–100)
MONOCYTES # BLD: 0.7 K/MCL (ref 0.3–0.9)
MONOCYTES NFR BLD: 7 %
NEUTROPHILS # BLD: 8.4 K/MCL (ref 1.8–7.7)
NEUTROPHILS NFR BLD: 78 %
NRBC BLD MANUAL-RTO: 0 /100 WBC
PLATELET # BLD AUTO: 262 K/MCL (ref 140–450)
POTASSIUM SERPL-SCNC: 4.4 MMOL/L (ref 3.4–5.1)
PROT SERPL-MCNC: 7.5 G/DL (ref 6.4–8.2)
RBC # BLD: 4.85 MIL/MCL (ref 4–5.2)
SODIUM SERPL-SCNC: 136 MMOL/L (ref 135–145)
WBC # BLD: 10.6 K/MCL (ref 4.2–11)

## 2023-03-04 PROCEDURE — 99233 SBSQ HOSP IP/OBS HIGH 50: CPT | Performed by: INTERNAL MEDICINE

## 2023-03-04 PROCEDURE — C9113 INJ PANTOPRAZOLE SODIUM, VIA: HCPCS | Performed by: INTERNAL MEDICINE

## 2023-03-04 PROCEDURE — 80053 COMPREHEN METABOLIC PANEL: CPT | Performed by: NURSE PRACTITIONER

## 2023-03-04 PROCEDURE — 10000002 HB ROOM CHARGE MED SURG

## 2023-03-04 PROCEDURE — 85025 COMPLETE CBC W/AUTO DIFF WBC: CPT | Performed by: NURSE PRACTITIONER

## 2023-03-04 PROCEDURE — 36415 COLL VENOUS BLD VENIPUNCTURE: CPT | Performed by: NURSE PRACTITIONER

## 2023-03-04 PROCEDURE — 10004651 HB RX, NO CHARGE ITEM: Performed by: INTERNAL MEDICINE

## 2023-03-04 PROCEDURE — 10002807 HB RX 258: Performed by: INTERNAL MEDICINE

## 2023-03-04 PROCEDURE — 10002800 HB RX 250 W HCPCS: Performed by: INTERNAL MEDICINE

## 2023-03-04 RX ORDER — ACETAMINOPHEN 325 MG/1
650 TABLET ORAL EVERY 4 HOURS PRN
Status: SHIPPED | COMMUNITY
Start: 2023-03-04

## 2023-03-04 RX ADMIN — PANTOPRAZOLE SODIUM 40 MG: 40 INJECTION, POWDER, FOR SOLUTION INTRAVENOUS at 20:26

## 2023-03-04 RX ADMIN — KETOROLAC TROMETHAMINE 15 MG: 15 INJECTION, SOLUTION INTRAMUSCULAR; INTRAVENOUS at 17:23

## 2023-03-04 RX ADMIN — SODIUM CHLORIDE, PRESERVATIVE FREE 2 ML: 5 INJECTION INTRAVENOUS at 20:26

## 2023-03-04 RX ADMIN — KETOROLAC TROMETHAMINE 15 MG: 15 INJECTION, SOLUTION INTRAMUSCULAR; INTRAVENOUS at 07:11

## 2023-03-04 RX ADMIN — SODIUM CHLORIDE, POTASSIUM CHLORIDE, SODIUM LACTATE AND CALCIUM CHLORIDE: 600; 310; 30; 20 INJECTION, SOLUTION INTRAVENOUS at 07:13

## 2023-03-04 ASSESSMENT — PAIN SCALES - GENERAL
PAINLEVEL_OUTOF10: 3
PAINLEVEL_OUTOF10: 6
PAINLEVEL_OUTOF10: 0

## 2023-03-05 VITALS
RESPIRATION RATE: 18 BRPM | TEMPERATURE: 98.2 F | HEART RATE: 79 BPM | SYSTOLIC BLOOD PRESSURE: 138 MMHG | OXYGEN SATURATION: 96 % | DIASTOLIC BLOOD PRESSURE: 88 MMHG

## 2023-03-05 LAB
ALBUMIN SERPL-MCNC: 3.3 G/DL (ref 3.6–5.1)
ALBUMIN/GLOB SERPL: 0.9 {RATIO} (ref 1–2.4)
ALP SERPL-CCNC: 165 UNITS/L (ref 45–117)
ALT SERPL-CCNC: 499 UNITS/L
AMYLASE SERPL-CCNC: 60 UNITS/L (ref 25–115)
ANION GAP SERPL CALC-SCNC: 10 MMOL/L (ref 7–19)
ANNOTATION COMMENT IMP: NORMAL
AST SERPL-CCNC: 81 UNITS/L
BASOPHILS # BLD: 0 K/MCL (ref 0–0.3)
BASOPHILS NFR BLD: 1 %
BILIRUB SERPL-MCNC: 0.9 MG/DL (ref 0.2–1)
BUN SERPL-MCNC: 8 MG/DL (ref 6–20)
BUN/CREAT SERPL: 14 (ref 7–25)
CALCIUM SERPL-MCNC: 9.2 MG/DL (ref 8.4–10.2)
CHLORIDE SERPL-SCNC: 107 MMOL/L (ref 97–110)
CO2 SERPL-SCNC: 25 MMOL/L (ref 21–32)
CREAT SERPL-MCNC: 0.59 MG/DL (ref 0.51–0.95)
CRP SERPL-MCNC: 0.6 MG/DL
DEPRECATED RDW RBC: 41.7 FL (ref 39–50)
EOSINOPHIL # BLD: 0.1 K/MCL (ref 0–0.5)
EOSINOPHIL NFR BLD: 2 %
ERYTHROCYTE [DISTWIDTH] IN BLOOD: 13.7 % (ref 11–15)
FASTING DURATION TIME PATIENT: 0 HOURS (ref 0–999)
GFR SERPLBLD BASED ON 1.73 SQ M-ARVRAT: >90 ML/MIN
GLOBULIN SER-MCNC: 3.5 G/DL (ref 2–4)
GLUCOSE SERPL-MCNC: 91 MG/DL (ref 70–99)
HAV IGM SER QL: NEGATIVE
HBV CORE IGM SER QL: NEGATIVE
HBV SURFACE AG SER QL: NEGATIVE
HCT VFR BLD CALC: 39.9 % (ref 36–46.5)
HCV AB SER QL: NEGATIVE
HGB BLD-MCNC: 13 G/DL (ref 12–15.5)
IMM GRANULOCYTES # BLD AUTO: 0 K/MCL (ref 0–0.2)
IMM GRANULOCYTES # BLD: 0 %
IMP & REVIEW OF LAB RESULTS: NORMAL
LIPASE SERPL-CCNC: 286 UNITS/L (ref 73–393)
LYMPHOCYTES # BLD: 2.9 K/MCL (ref 1–4.8)
LYMPHOCYTES NFR BLD: 34 %
MAGNESIUM SERPL-MCNC: 2 MG/DL (ref 1.7–2.4)
MCH RBC QN AUTO: 27.3 PG (ref 26–34)
MCHC RBC AUTO-ENTMCNC: 32.6 G/DL (ref 32–36.5)
MCV RBC AUTO: 83.6 FL (ref 78–100)
MONOCYTES # BLD: 0.6 K/MCL (ref 0.3–0.9)
MONOCYTES NFR BLD: 7 %
NEUTROPHILS # BLD: 4.8 K/MCL (ref 1.8–7.7)
NEUTROPHILS NFR BLD: 56 %
NRBC BLD MANUAL-RTO: 0 /100 WBC
PHOSPHATE SERPL-MCNC: 3.5 MG/DL (ref 2.4–4.7)
PLATELET # BLD AUTO: 246 K/MCL (ref 140–450)
POTASSIUM SERPL-SCNC: 3.5 MMOL/L (ref 3.4–5.1)
PROT SERPL-MCNC: 6.8 G/DL (ref 6.4–8.2)
RBC # BLD: 4.77 MIL/MCL (ref 4–5.2)
SODIUM SERPL-SCNC: 138 MMOL/L (ref 135–145)
WBC # BLD: 8.5 K/MCL (ref 4.2–11)

## 2023-03-05 PROCEDURE — 86140 C-REACTIVE PROTEIN: CPT | Performed by: SURGERY

## 2023-03-05 PROCEDURE — 83690 ASSAY OF LIPASE: CPT | Performed by: SURGERY

## 2023-03-05 PROCEDURE — 80053 COMPREHEN METABOLIC PANEL: CPT | Performed by: SURGERY

## 2023-03-05 PROCEDURE — 83735 ASSAY OF MAGNESIUM: CPT | Performed by: SURGERY

## 2023-03-05 PROCEDURE — 36415 COLL VENOUS BLD VENIPUNCTURE: CPT | Performed by: SURGERY

## 2023-03-05 PROCEDURE — 85025 COMPLETE CBC W/AUTO DIFF WBC: CPT | Performed by: SURGERY

## 2023-03-05 PROCEDURE — 10002807 HB RX 258: Performed by: INTERNAL MEDICINE

## 2023-03-05 PROCEDURE — 80074 ACUTE HEPATITIS PANEL: CPT | Performed by: SURGERY

## 2023-03-05 PROCEDURE — 82150 ASSAY OF AMYLASE: CPT | Performed by: SURGERY

## 2023-03-05 PROCEDURE — 84100 ASSAY OF PHOSPHORUS: CPT | Performed by: SURGERY

## 2023-03-05 RX ORDER — AMOXICILLIN AND CLAVULANATE POTASSIUM 875; 125 MG/1; MG/1
1 TABLET, FILM COATED ORAL 2 TIMES DAILY
Qty: 20 TABLET | Refills: 0 | Status: SHIPPED | OUTPATIENT
Start: 2023-03-05 | End: 2023-03-15

## 2023-03-05 RX ORDER — AMOXICILLIN AND CLAVULANATE POTASSIUM 875; 125 MG/1; MG/1
1 TABLET, FILM COATED ORAL 2 TIMES DAILY
Qty: 20 TABLET | Refills: 0 | Status: SHIPPED | OUTPATIENT
Start: 2023-03-05 | End: 2023-03-06 | Stop reason: SDUPTHER

## 2023-03-05 RX ORDER — ONDANSETRON 4 MG/1
4 TABLET, ORALLY DISINTEGRATING ORAL EVERY 4 HOURS PRN
Qty: 12 TABLET | Refills: 0 | Status: SHIPPED
Start: 2023-03-05

## 2023-03-05 RX ORDER — ONDANSETRON 4 MG/1
4 TABLET, ORALLY DISINTEGRATING ORAL EVERY 4 HOURS PRN
Qty: 12 TABLET | Refills: 0 | Status: SHIPPED | OUTPATIENT
Start: 2023-03-05 | End: 2023-03-06 | Stop reason: SDUPTHER

## 2023-03-05 RX ADMIN — SODIUM CHLORIDE, POTASSIUM CHLORIDE, SODIUM LACTATE AND CALCIUM CHLORIDE: 600; 310; 30; 20 INJECTION, SOLUTION INTRAVENOUS at 02:17

## 2023-03-05 RX ADMIN — SODIUM CHLORIDE, POTASSIUM CHLORIDE, SODIUM LACTATE AND CALCIUM CHLORIDE: 600; 310; 30; 20 INJECTION, SOLUTION INTRAVENOUS at 11:01

## 2023-03-05 ASSESSMENT — PAIN SCALES - GENERAL: PAINLEVEL_OUTOF10: 0

## 2023-03-06 ENCOUNTER — TELEPHONE (OUTPATIENT)
Dept: INPATIENT UNIT | Age: 38
End: 2023-03-06

## 2023-03-06 ASSESSMENT — ACTIVITIES OF DAILY LIVING (ADL)
SENSORY_SUPPORT_DEVICES: EYEGLASSES
ADL_SCORE: 12
RECENT_DECLINE_ADL: NO
HISTORY OF FALLING IN THE LAST YEAR (PRIOR TO ADMISSION): YES
CHRONIC_PAIN_PRESENT: NO
ADL_BEFORE_ADMISSION: INDEPENDENT
ADL_SHORT_OF_BREATH: NO
NEEDS_ASSIST: NO

## 2023-03-06 ASSESSMENT — COGNITIVE AND FUNCTIONAL STATUS - GENERAL
ARE YOU DEAF OR DO YOU HAVE SERIOUS DIFFICULTY  HEARING: NO
ARE YOU BLIND OR DO YOU HAVE SERIOUS DIFFICULTY SEEING, EVEN WHEN WEARING GLASSES: NO

## 2023-03-07 ENCOUNTER — HOSPITAL ENCOUNTER (OUTPATIENT)
Age: 38
Setting detail: OBSERVATION
Discharge: HOME | End: 2023-03-08
Attending: SURGERY | Admitting: SURGERY

## 2023-03-07 ENCOUNTER — ANESTHESIA EVENT (OUTPATIENT)
Dept: SURGERY | Age: 38
End: 2023-03-07

## 2023-03-07 ENCOUNTER — ANESTHESIA (OUTPATIENT)
Dept: SURGERY | Age: 38
End: 2023-03-07

## 2023-03-07 DIAGNOSIS — K80.11 CALCULUS OF GALLBLADDER WITH CHRONIC CHOLECYSTITIS WITH OBSTRUCTION: Primary | ICD-10-CM

## 2023-03-07 PROBLEM — K81.9 CHOLECYSTITIS: Status: ACTIVE | Noted: 2023-03-07

## 2023-03-07 LAB
B-HCG UR QL: NEGATIVE
INTERNAL PROCEDURAL CONTROLS ACCEPTABLE: YES
TEST LOT EXPIRATION DATE: NORMAL
TEST LOT NUMBER: NORMAL

## 2023-03-07 PROCEDURE — 10002801 HB RX 250 W/O HCPCS: Performed by: SURGERY

## 2023-03-07 PROCEDURE — 81025 URINE PREGNANCY TEST: CPT | Performed by: SURGERY

## 2023-03-07 PROCEDURE — 10002807 HB RX 258: Performed by: SURGERY

## 2023-03-07 PROCEDURE — 88304 TISSUE EXAM BY PATHOLOGIST: CPT | Performed by: SURGERY

## 2023-03-07 PROCEDURE — 10002801 HB RX 250 W/O HCPCS

## 2023-03-07 PROCEDURE — 13000037 HB COMPLEX CASE EACH ADD MINUTE: Performed by: SURGERY

## 2023-03-07 PROCEDURE — 10006023 HB SUPPLY 272: Performed by: SURGERY

## 2023-03-07 PROCEDURE — 10004451 HB PACU RECOVERY 1ST 30 MINUTES: Performed by: SURGERY

## 2023-03-07 PROCEDURE — 10002800 HB RX 250 W HCPCS

## 2023-03-07 PROCEDURE — 10002800 HB RX 250 W HCPCS: Performed by: SURGERY

## 2023-03-07 PROCEDURE — 13000003 HB ANESTHESIA  GENERAL EA ADD MINUTE: Performed by: SURGERY

## 2023-03-07 PROCEDURE — 10004452 HB PACU ADDL 30 MINUTES: Performed by: SURGERY

## 2023-03-07 PROCEDURE — 10002807 HB RX 258

## 2023-03-07 PROCEDURE — G0378 HOSPITAL OBSERVATION PER HR: HCPCS

## 2023-03-07 PROCEDURE — 10006027 HB SUPPLY 278: Performed by: SURGERY

## 2023-03-07 PROCEDURE — 13000002 HB ANESTHESIA  GENERAL  S/U + 1ST 15 MIN: Performed by: SURGERY

## 2023-03-07 PROCEDURE — 13000036 HB COMPLEX  CASE S/U + 1ST 15 MIN: Performed by: SURGERY

## 2023-03-07 DEVICE — HEMOLOK L 6 CLIPS/CART
Type: IMPLANTABLE DEVICE | Site: ABDOMEN | Status: FUNCTIONAL
Brand: WECK

## 2023-03-07 DEVICE — LIGACLIP 10-M/L, 10MM ENDOSCOPIC ROTATING MULTIPLE CLIP APPLIERS
Type: IMPLANTABLE DEVICE | Site: ABDOMEN | Status: FUNCTIONAL
Brand: LIGACLIP

## 2023-03-07 RX ORDER — FAMOTIDINE 10 MG/ML
INJECTION, SOLUTION INTRAVENOUS PRN
Status: DISCONTINUED | OUTPATIENT
Start: 2023-03-07 | End: 2023-03-07

## 2023-03-07 RX ORDER — PROPOFOL 10 MG/ML
INJECTION, EMULSION INTRAVENOUS PRN
Status: DISCONTINUED | OUTPATIENT
Start: 2023-03-07 | End: 2023-03-07

## 2023-03-07 RX ORDER — FAMOTIDINE 10 MG/ML
20 INJECTION, SOLUTION INTRAVENOUS EVERY 12 HOURS SCHEDULED
Status: DISCONTINUED | OUTPATIENT
Start: 2023-03-07 | End: 2023-03-08 | Stop reason: HOSPADM

## 2023-03-07 RX ORDER — 0.9 % SODIUM CHLORIDE 0.9 %
2 VIAL (ML) INJECTION EVERY 12 HOURS SCHEDULED
Status: DISCONTINUED | OUTPATIENT
Start: 2023-03-07 | End: 2023-03-08 | Stop reason: HOSPADM

## 2023-03-07 RX ORDER — ONDANSETRON 2 MG/ML
4 INJECTION INTRAMUSCULAR; INTRAVENOUS EVERY 4 HOURS PRN
Status: DISCONTINUED | OUTPATIENT
Start: 2023-03-07 | End: 2023-03-08 | Stop reason: HOSPADM

## 2023-03-07 RX ORDER — DEXAMETHASONE SODIUM PHOSPHATE 4 MG/ML
INJECTION, SOLUTION INTRA-ARTICULAR; INTRALESIONAL; INTRAMUSCULAR; INTRAVENOUS; SOFT TISSUE PRN
Status: DISCONTINUED | OUTPATIENT
Start: 2023-03-07 | End: 2023-03-07

## 2023-03-07 RX ORDER — ENOXAPARIN SODIUM 100 MG/ML
40 INJECTION SUBCUTANEOUS DAILY
Status: DISCONTINUED | OUTPATIENT
Start: 2023-03-08 | End: 2023-03-08 | Stop reason: HOSPADM

## 2023-03-07 RX ORDER — BUPIVACAINE HYDROCHLORIDE 5 MG/ML
INJECTION, SOLUTION EPIDURAL; INTRACAUDAL PRN
Status: DISCONTINUED | OUTPATIENT
Start: 2023-03-07 | End: 2023-03-07 | Stop reason: HOSPADM

## 2023-03-07 RX ORDER — DEXTROSE MONOHYDRATE, SODIUM CHLORIDE, AND POTASSIUM CHLORIDE 50; 1.49; 4.5 G/1000ML; G/1000ML; G/1000ML
INJECTION, SOLUTION INTRAVENOUS CONTINUOUS
Status: DISCONTINUED | OUTPATIENT
Start: 2023-03-07 | End: 2023-03-08 | Stop reason: HOSPADM

## 2023-03-07 RX ORDER — SODIUM CHLORIDE, SODIUM LACTATE, POTASSIUM CHLORIDE, CALCIUM CHLORIDE 600; 310; 30; 20 MG/100ML; MG/100ML; MG/100ML; MG/100ML
INJECTION, SOLUTION INTRAVENOUS CONTINUOUS PRN
Status: DISCONTINUED | OUTPATIENT
Start: 2023-03-07 | End: 2023-03-07

## 2023-03-07 RX ORDER — LIDOCAINE HYDROCHLORIDE 10 MG/ML
INJECTION, SOLUTION INFILTRATION; PERINEURAL PRN
Status: DISCONTINUED | OUTPATIENT
Start: 2023-03-07 | End: 2023-03-07

## 2023-03-07 RX ORDER — ONDANSETRON 2 MG/ML
INJECTION INTRAMUSCULAR; INTRAVENOUS PRN
Status: DISCONTINUED | OUTPATIENT
Start: 2023-03-07 | End: 2023-03-07

## 2023-03-07 RX ORDER — ROCURONIUM BROMIDE 10 MG/ML
INJECTION, SOLUTION INTRAVENOUS PRN
Status: DISCONTINUED | OUTPATIENT
Start: 2023-03-07 | End: 2023-03-07

## 2023-03-07 RX ORDER — HYDROCODONE BITARTRATE AND ACETAMINOPHEN 5; 325 MG/1; MG/1
1 TABLET ORAL EVERY 4 HOURS PRN
Status: DISCONTINUED | OUTPATIENT
Start: 2023-03-07 | End: 2023-03-08 | Stop reason: HOSPADM

## 2023-03-07 RX ORDER — ONDANSETRON 2 MG/ML
4 INJECTION INTRAMUSCULAR; INTRAVENOUS
Status: DISPENSED | OUTPATIENT
Start: 2023-03-07 | End: 2023-03-07

## 2023-03-07 RX ADMIN — SUGAMMADEX 400 MG: 100 INJECTION, SOLUTION INTRAVENOUS at 15:50

## 2023-03-07 RX ADMIN — MORPHINE SULFATE 2 MG: 2 INJECTION, SOLUTION INTRAMUSCULAR; INTRAVENOUS at 21:44

## 2023-03-07 RX ADMIN — ROCURONIUM BROMIDE 20 MG: 10 INJECTION, SOLUTION INTRAVENOUS at 15:04

## 2023-03-07 RX ADMIN — ROCURONIUM BROMIDE 20 MG: 10 INJECTION, SOLUTION INTRAVENOUS at 14:37

## 2023-03-07 RX ADMIN — FAMOTIDINE 20 MG: 10 INJECTION INTRAVENOUS at 14:31

## 2023-03-07 RX ADMIN — ONDANSETRON 4 MG: 2 INJECTION INTRAMUSCULAR; INTRAVENOUS at 14:30

## 2023-03-07 RX ADMIN — POTASSIUM CHLORIDE, DEXTROSE MONOHYDRATE AND SODIUM CHLORIDE: 150; 5; 450 INJECTION, SOLUTION INTRAVENOUS at 20:54

## 2023-03-07 RX ADMIN — FENTANYL CITRATE 100 MCG: 50 INJECTION, SOLUTION INTRAMUSCULAR; INTRAVENOUS at 14:08

## 2023-03-07 RX ADMIN — PROPOFOL 300 MG: 10 INJECTION, EMULSION INTRAVENOUS at 14:05

## 2023-03-07 RX ADMIN — DEXAMETHASONE SODIUM PHOSPHATE 8 MG: 4 INJECTION, SOLUTION INTRAMUSCULAR; INTRAVENOUS at 14:30

## 2023-03-07 RX ADMIN — CEFAZOLIN SODIUM 2000 MG: 300 INJECTION, POWDER, LYOPHILIZED, FOR SOLUTION INTRAVENOUS at 21:00

## 2023-03-07 RX ADMIN — FENTANYL CITRATE 50 MCG: 50 INJECTION INTRAMUSCULAR; INTRAVENOUS at 16:45

## 2023-03-07 RX ADMIN — ROCURONIUM BROMIDE 40 MG: 10 INJECTION, SOLUTION INTRAVENOUS at 14:12

## 2023-03-07 RX ADMIN — Medication 120 MG: at 14:06

## 2023-03-07 RX ADMIN — SODIUM CHLORIDE, POTASSIUM CHLORIDE, SODIUM LACTATE AND CALCIUM CHLORIDE: 600; 310; 30; 20 INJECTION, SOLUTION INTRAVENOUS at 14:04

## 2023-03-07 RX ADMIN — ROCURONIUM BROMIDE 10 MG: 10 INJECTION, SOLUTION INTRAVENOUS at 14:05

## 2023-03-07 RX ADMIN — FAMOTIDINE 20 MG: 10 INJECTION INTRAVENOUS at 20:46

## 2023-03-07 RX ADMIN — LIDOCAINE HYDROCHLORIDE 50 MG: 10 INJECTION, SOLUTION INFILTRATION; PERINEURAL at 14:05

## 2023-03-07 RX ADMIN — CEFAZOLIN SODIUM 2000 MG: 300 INJECTION, POWDER, LYOPHILIZED, FOR SOLUTION INTRAVENOUS at 14:15

## 2023-03-07 SDOH — SOCIAL STABILITY: SOCIAL INSECURITY: RISK FACTORS: LIVER DISEASE

## 2023-03-07 SDOH — SOCIAL STABILITY: SOCIAL INSECURITY: RISK FACTORS: SLEEP APNEA

## 2023-03-07 SDOH — SOCIAL STABILITY: SOCIAL INSECURITY: RISK FACTORS: BMI> 30 (OBESITY)

## 2023-03-07 ASSESSMENT — PAIN SCALES - GENERAL
PAINLEVEL_OUTOF10: 3
PAINLEVEL_OUTOF10: 6
PAINLEVEL_OUTOF10: 3
PAINLEVEL_OUTOF10: 3
PAINLEVEL_OUTOF10: 8
PAINLEVEL_OUTOF10: 5

## 2023-03-07 ASSESSMENT — ENCOUNTER SYMPTOMS: EXERCISE TOLERANCE: GOOD (>4 METS)

## 2023-03-08 VITALS
RESPIRATION RATE: 16 BRPM | SYSTOLIC BLOOD PRESSURE: 143 MMHG | OXYGEN SATURATION: 98 % | WEIGHT: 201.5 LBS | TEMPERATURE: 99.5 F | HEART RATE: 82 BPM | DIASTOLIC BLOOD PRESSURE: 91 MMHG | BODY MASS INDEX: 39.56 KG/M2 | HEIGHT: 60 IN

## 2023-03-08 LAB
ALBUMIN SERPL-MCNC: 3.3 G/DL (ref 3.6–5.1)
ALBUMIN/GLOB SERPL: 0.9 {RATIO} (ref 1–2.4)
ALP SERPL-CCNC: 119 UNITS/L (ref 45–117)
ALT SERPL-CCNC: 188 UNITS/L
ANION GAP SERPL CALC-SCNC: 8 MMOL/L (ref 7–19)
AST SERPL-CCNC: 22 UNITS/L
BASOPHILS # BLD: 0 K/MCL (ref 0–0.3)
BASOPHILS NFR BLD: 0 %
BILIRUB SERPL-MCNC: 0.5 MG/DL (ref 0.2–1)
BUN SERPL-MCNC: 6 MG/DL (ref 6–20)
BUN/CREAT SERPL: 11 (ref 7–25)
CALCIUM SERPL-MCNC: 8.9 MG/DL (ref 8.4–10.2)
CHLORIDE SERPL-SCNC: 108 MMOL/L (ref 97–110)
CO2 SERPL-SCNC: 25 MMOL/L (ref 21–32)
CREAT SERPL-MCNC: 0.56 MG/DL (ref 0.51–0.95)
DEPRECATED RDW RBC: 40.4 FL (ref 39–50)
EOSINOPHIL # BLD: 0 K/MCL (ref 0–0.5)
EOSINOPHIL NFR BLD: 0 %
ERYTHROCYTE [DISTWIDTH] IN BLOOD: 13.3 % (ref 11–15)
FASTING DURATION TIME PATIENT: ABNORMAL H
GFR SERPLBLD BASED ON 1.73 SQ M-ARVRAT: >90 ML/MIN
GLOBULIN SER-MCNC: 3.7 G/DL (ref 2–4)
GLUCOSE SERPL-MCNC: 137 MG/DL (ref 70–99)
HCT VFR BLD CALC: 41.5 % (ref 36–46.5)
HGB BLD-MCNC: 13.3 G/DL (ref 12–15.5)
IMM GRANULOCYTES # BLD AUTO: 0.1 K/MCL (ref 0–0.2)
IMM GRANULOCYTES # BLD: 0 %
LYMPHOCYTES # BLD: 1.6 K/MCL (ref 1–4.8)
LYMPHOCYTES NFR BLD: 12 %
MAGNESIUM SERPL-MCNC: 2.1 MG/DL (ref 1.7–2.4)
MCH RBC QN AUTO: 26.8 PG (ref 26–34)
MCHC RBC AUTO-ENTMCNC: 32 G/DL (ref 32–36.5)
MCV RBC AUTO: 83.7 FL (ref 78–100)
MONOCYTES # BLD: 0.9 K/MCL (ref 0.3–0.9)
MONOCYTES NFR BLD: 7 %
NEUTROPHILS # BLD: 10.4 K/MCL (ref 1.8–7.7)
NEUTROPHILS NFR BLD: 81 %
NRBC BLD MANUAL-RTO: 0 /100 WBC
PHOSPHATE SERPL-MCNC: 3 MG/DL (ref 2.4–4.7)
PLATELET # BLD AUTO: 277 K/MCL (ref 140–450)
POTASSIUM SERPL-SCNC: 3.8 MMOL/L (ref 3.4–5.1)
PROT SERPL-MCNC: 7 G/DL (ref 6.4–8.2)
RBC # BLD: 4.96 MIL/MCL (ref 4–5.2)
SODIUM SERPL-SCNC: 137 MMOL/L (ref 135–145)
WBC # BLD: 12.9 K/MCL (ref 4.2–11)

## 2023-03-08 PROCEDURE — 96374 THER/PROPH/DIAG INJ IV PUSH: CPT

## 2023-03-08 PROCEDURE — 10002800 HB RX 250 W HCPCS: Performed by: SURGERY

## 2023-03-08 PROCEDURE — G0378 HOSPITAL OBSERVATION PER HR: HCPCS

## 2023-03-08 PROCEDURE — 10002801 HB RX 250 W/O HCPCS: Performed by: SURGERY

## 2023-03-08 PROCEDURE — 10004651 HB RX, NO CHARGE ITEM: Performed by: SURGERY

## 2023-03-08 PROCEDURE — 96376 TX/PRO/DX INJ SAME DRUG ADON: CPT

## 2023-03-08 PROCEDURE — 80053 COMPREHEN METABOLIC PANEL: CPT | Performed by: SURGERY

## 2023-03-08 PROCEDURE — 96375 TX/PRO/DX INJ NEW DRUG ADDON: CPT

## 2023-03-08 PROCEDURE — 83735 ASSAY OF MAGNESIUM: CPT | Performed by: SURGERY

## 2023-03-08 PROCEDURE — 36415 COLL VENOUS BLD VENIPUNCTURE: CPT | Performed by: SURGERY

## 2023-03-08 PROCEDURE — 84100 ASSAY OF PHOSPHORUS: CPT | Performed by: SURGERY

## 2023-03-08 PROCEDURE — 96372 THER/PROPH/DIAG INJ SC/IM: CPT

## 2023-03-08 PROCEDURE — 10002807 HB RX 258: Performed by: SURGERY

## 2023-03-08 PROCEDURE — 85025 COMPLETE CBC W/AUTO DIFF WBC: CPT | Performed by: SURGERY

## 2023-03-08 RX ORDER — HYDROCODONE BITARTRATE AND ACETAMINOPHEN 5; 325 MG/1; MG/1
1 TABLET ORAL EVERY 4 HOURS PRN
Qty: 20 TABLET | Refills: 0 | Status: SHIPPED | OUTPATIENT
Start: 2023-03-08

## 2023-03-08 RX ORDER — ACETAMINOPHEN 325 MG/1
650 TABLET ORAL EVERY 4 HOURS PRN
Status: DISCONTINUED | OUTPATIENT
Start: 2023-03-08 | End: 2023-03-08 | Stop reason: HOSPADM

## 2023-03-08 RX ORDER — DOCUSATE SODIUM 100 MG/1
100 CAPSULE, LIQUID FILLED ORAL 2 TIMES DAILY
Qty: 14 CAPSULE | Refills: 0 | Status: SHIPPED | OUTPATIENT
Start: 2023-03-08 | End: 2023-03-15

## 2023-03-08 RX ORDER — ONDANSETRON 4 MG/1
4 TABLET, ORALLY DISINTEGRATING ORAL EVERY 4 HOURS PRN
Qty: 12 TABLET | Refills: 0 | Status: SHIPPED | OUTPATIENT
Start: 2023-03-08

## 2023-03-08 RX ORDER — CEFADROXIL 500 MG/1
500 CAPSULE ORAL 2 TIMES DAILY
Qty: 6 CAPSULE | Refills: 0 | Status: SHIPPED
Start: 2023-03-08 | End: 2023-03-11

## 2023-03-08 RX ADMIN — FAMOTIDINE 20 MG: 10 INJECTION INTRAVENOUS at 09:31

## 2023-03-08 RX ADMIN — CEFAZOLIN SODIUM 2000 MG: 300 INJECTION, POWDER, LYOPHILIZED, FOR SOLUTION INTRAVENOUS at 05:27

## 2023-03-08 RX ADMIN — ONDANSETRON 4 MG: 2 INJECTION INTRAMUSCULAR; INTRAVENOUS at 03:47

## 2023-03-08 RX ADMIN — SODIUM CHLORIDE, PRESERVATIVE FREE 2 ML: 5 INJECTION INTRAVENOUS at 09:32

## 2023-03-08 RX ADMIN — POTASSIUM CHLORIDE, DEXTROSE MONOHYDRATE AND SODIUM CHLORIDE: 150; 5; 450 INJECTION, SOLUTION INTRAVENOUS at 03:42

## 2023-03-08 RX ADMIN — ENOXAPARIN SODIUM 40 MG: 40 INJECTION SUBCUTANEOUS at 09:31

## 2023-03-08 RX ADMIN — MORPHINE SULFATE 2 MG: 2 INJECTION, SOLUTION INTRAMUSCULAR; INTRAVENOUS at 13:06

## 2023-03-08 RX ADMIN — POTASSIUM CHLORIDE, DEXTROSE MONOHYDRATE AND SODIUM CHLORIDE: 150; 5; 450 INJECTION, SOLUTION INTRAVENOUS at 13:12

## 2023-03-08 RX ADMIN — MORPHINE SULFATE 2 MG: 2 INJECTION, SOLUTION INTRAMUSCULAR; INTRAVENOUS at 03:48

## 2023-03-08 RX ADMIN — ACETAMINOPHEN 650 MG: 325 TABLET ORAL at 12:41

## 2023-03-08 RX ADMIN — CEFAZOLIN SODIUM 2000 MG: 300 INJECTION, POWDER, LYOPHILIZED, FOR SOLUTION INTRAVENOUS at 14:08

## 2023-03-08 ASSESSMENT — PAIN SCALES - GENERAL
PAINLEVEL_OUTOF10: 8
PAINLEVEL_OUTOF10: 8

## 2023-03-10 LAB
ASR DISCLAIMER: NORMAL
CASE RPRT: NORMAL
CLINICAL INFO: NORMAL
PATH REPORT.FINAL DX SPEC: NORMAL
PATH REPORT.GROSS SPEC: NORMAL

## 2023-04-21 ENCOUNTER — HOSPITAL ENCOUNTER (OUTPATIENT)
Dept: MAMMOGRAPHY | Age: 38
Discharge: HOME | End: 2023-04-21
Attending: OBSTETRICS & GYNECOLOGY

## 2023-04-21 DIAGNOSIS — Z12.31 ENCOUNTER FOR SCREENING MAMMOGRAM FOR MALIGNANT NEOPLASM OF BREAST: ICD-10-CM

## 2023-04-21 DIAGNOSIS — R92.2 DENSE BREAST: ICD-10-CM

## 2023-04-21 DIAGNOSIS — R92.30 DENSE BREAST: ICD-10-CM

## 2023-04-21 PROCEDURE — 77063 BREAST TOMOSYNTHESIS BI: CPT

## 2023-04-21 PROCEDURE — 76641 ULTRASOUND BREAST COMPLETE: CPT

## 2024-05-01 DIAGNOSIS — Z12.31 VISIT FOR SCREENING MAMMOGRAM: Primary | ICD-10-CM

## 2024-05-01 DIAGNOSIS — R92.30 DENSE BREASTS: ICD-10-CM

## 2024-07-29 ENCOUNTER — HOSPITAL ENCOUNTER (OUTPATIENT)
Dept: MAMMOGRAPHY | Age: 39
Discharge: HOME | End: 2024-07-29
Attending: OBSTETRICS & GYNECOLOGY

## 2024-07-29 DIAGNOSIS — R92.30 DENSE BREASTS: ICD-10-CM

## 2024-07-29 DIAGNOSIS — Z12.31 VISIT FOR SCREENING MAMMOGRAM: ICD-10-CM

## 2024-07-29 PROCEDURE — 76641 ULTRASOUND BREAST COMPLETE: CPT

## 2024-07-29 PROCEDURE — 77063 BREAST TOMOSYNTHESIS BI: CPT

## 2025-01-12 ENCOUNTER — APPOINTMENT (OUTPATIENT)
Dept: GENERAL RADIOLOGY | Age: 40
End: 2025-01-12
Attending: EMERGENCY MEDICINE

## 2025-01-12 ENCOUNTER — HOSPITAL ENCOUNTER (EMERGENCY)
Age: 40
End: 2025-01-12
Attending: EMERGENCY MEDICINE

## 2025-01-12 ENCOUNTER — APPOINTMENT (OUTPATIENT)
Dept: CT IMAGING | Age: 40
End: 2025-01-12
Attending: EMERGENCY MEDICINE

## 2025-01-12 VITALS
WEIGHT: 220 LBS | BODY MASS INDEX: 43.19 KG/M2 | DIASTOLIC BLOOD PRESSURE: 102 MMHG | SYSTOLIC BLOOD PRESSURE: 153 MMHG | HEART RATE: 112 BPM | HEIGHT: 60 IN | RESPIRATION RATE: 18 BRPM | TEMPERATURE: 97.5 F | OXYGEN SATURATION: 100 %

## 2025-01-12 DIAGNOSIS — R11.2 NAUSEA AND VOMITING IN ADULT: Primary | ICD-10-CM

## 2025-01-12 DIAGNOSIS — R55 VASOVAGAL SYNCOPE: ICD-10-CM

## 2025-01-12 LAB
ALBUMIN SERPL-MCNC: 4.2 G/DL (ref 3.4–5)
ALBUMIN/GLOB SERPL: 1 {RATIO} (ref 1–2.4)
ALP SERPL-CCNC: 86 UNITS/L (ref 45–117)
ALT SERPL-CCNC: 21 UNITS/L
ANION GAP SERPL CALC-SCNC: 16 MMOL/L (ref 7–19)
AST SERPL-CCNC: 28 UNITS/L
ATRIAL RATE (BPM): 114
BASOPHILS # BLD: 0 K/MCL (ref 0–0.3)
BASOPHILS NFR BLD: 0 %
BILIRUB SERPL-MCNC: 0.6 MG/DL (ref 0.2–1)
BUN SERPL-MCNC: 14 MG/DL (ref 6–20)
BUN/CREAT SERPL: 23 (ref 7–25)
CALCIUM SERPL-MCNC: 9.2 MG/DL (ref 8.4–10.2)
CHLORIDE SERPL-SCNC: 105 MMOL/L (ref 97–110)
CO2 SERPL-SCNC: 25 MMOL/L (ref 21–32)
CREAT SERPL-MCNC: 0.62 MG/DL (ref 0.51–0.95)
DEPRECATED RDW RBC: 42.4 FL (ref 39–50)
EGFRCR SERPLBLD CKD-EPI 2021: >90 ML/MIN/{1.73_M2}
EOSINOPHIL # BLD: 0 K/MCL (ref 0–0.5)
EOSINOPHIL NFR BLD: 0 %
ERYTHROCYTE [DISTWIDTH] IN BLOOD: 14.8 % (ref 11–15)
FASTING DURATION TIME PATIENT: ABNORMAL H
FLUAV RNA RESP QL NAA+PROBE: NOT DETECTED
FLUBV RNA RESP QL NAA+PROBE: NOT DETECTED
GLOBULIN SER-MCNC: 4.3 G/DL (ref 2–4)
GLUCOSE BLDC GLUCOMTR-MCNC: 134 MG/DL (ref 70–99)
GLUCOSE SERPL-MCNC: 147 MG/DL (ref 70–99)
HCT VFR BLD CALC: 43.6 % (ref 36–46.5)
HGB BLD-MCNC: 13.8 G/DL (ref 12–15.5)
IMM GRANULOCYTES # BLD AUTO: 0.1 K/MCL (ref 0–0.2)
IMM GRANULOCYTES # BLD: 0 %
LIPASE SERPL-CCNC: 45 UNITS/L (ref 15–77)
LYMPHOCYTES # BLD: 0.3 K/MCL (ref 1–4.8)
LYMPHOCYTES NFR BLD: 2 %
MCH RBC QN AUTO: 25.2 PG (ref 26–34)
MCHC RBC AUTO-ENTMCNC: 31.7 G/DL (ref 32–36.5)
MCV RBC AUTO: 79.7 FL (ref 78–100)
MONOCYTES # BLD: 0.4 K/MCL (ref 0.3–0.9)
MONOCYTES NFR BLD: 3 %
NEUTROPHILS # BLD: 15.1 K/MCL (ref 1.8–7.7)
NEUTROPHILS NFR BLD: 95 %
NRBC BLD MANUAL-RTO: 0 /100 WBC
P AXIS (DEGREES): 62
PLATELET # BLD AUTO: 315 K/MCL (ref 140–450)
POTASSIUM SERPL-SCNC: 4.1 MMOL/L (ref 3.4–5.1)
PR-INTERVAL (MSEC): 126
PROT SERPL-MCNC: 8.5 G/DL (ref 6.4–8.2)
QRS-INTERVAL (MSEC): 82
QT-INTERVAL (MSEC): 314
QTC: 432
R AXIS (DEGREES): 17
RBC # BLD: 5.47 MIL/MCL (ref 4–5.2)
REPORT TEXT: NORMAL
RSV AG NPH QL IA.RAPID: NOT DETECTED
SARS-COV-2 RNA RESP QL NAA+PROBE: NOT DETECTED
SERVICE CMNT-IMP: NORMAL
SERVICE CMNT-IMP: NORMAL
SODIUM SERPL-SCNC: 142 MMOL/L (ref 135–145)
T AXIS (DEGREES): 34
VENTRICULAR RATE EKG/MIN (BPM): 114
WBC # BLD: 15.9 K/MCL (ref 4.2–11)

## 2025-01-12 PROCEDURE — 0241U COVID/FLU/RSV PANEL: CPT | Performed by: EMERGENCY MEDICINE

## 2025-01-12 PROCEDURE — 85025 COMPLETE CBC W/AUTO DIFF WBC: CPT | Performed by: EMERGENCY MEDICINE

## 2025-01-12 PROCEDURE — 80053 COMPREHEN METABOLIC PANEL: CPT | Performed by: EMERGENCY MEDICINE

## 2025-01-12 PROCEDURE — 93005 ELECTROCARDIOGRAM TRACING: CPT | Performed by: EMERGENCY MEDICINE

## 2025-01-12 PROCEDURE — 10002800 HB RX 250 W HCPCS: Performed by: EMERGENCY MEDICINE

## 2025-01-12 PROCEDURE — 99285 EMERGENCY DEPT VISIT HI MDM: CPT

## 2025-01-12 PROCEDURE — 84702 CHORIONIC GONADOTROPIN TEST: CPT | Performed by: EMERGENCY MEDICINE

## 2025-01-12 PROCEDURE — 70450 CT HEAD/BRAIN W/O DYE: CPT

## 2025-01-12 PROCEDURE — 71045 X-RAY EXAM CHEST 1 VIEW: CPT

## 2025-01-12 PROCEDURE — 10002807 HB RX 258: Performed by: EMERGENCY MEDICINE

## 2025-01-12 PROCEDURE — 82962 GLUCOSE BLOOD TEST: CPT

## 2025-01-12 PROCEDURE — 96374 THER/PROPH/DIAG INJ IV PUSH: CPT

## 2025-01-12 PROCEDURE — 83690 ASSAY OF LIPASE: CPT | Performed by: EMERGENCY MEDICINE

## 2025-01-12 RX ORDER — ONDANSETRON 2 MG/ML
4 INJECTION INTRAMUSCULAR; INTRAVENOUS ONCE
Status: COMPLETED | OUTPATIENT
Start: 2025-01-12 | End: 2025-01-12

## 2025-01-12 RX ADMIN — ONDANSETRON 4 MG: 2 INJECTION, SOLUTION INTRAMUSCULAR; INTRAVENOUS at 22:54

## 2025-01-12 RX ADMIN — SODIUM CHLORIDE 1000 ML: 9 INJECTION, SOLUTION INTRAVENOUS at 23:52

## 2025-01-12 ASSESSMENT — PAIN SCALES - GENERAL: PAINLEVEL_OUTOF10: 5

## 2025-01-13 ENCOUNTER — APPOINTMENT (OUTPATIENT)
Dept: CT IMAGING | Age: 40
End: 2025-01-13
Attending: EMERGENCY MEDICINE

## 2025-01-13 VITALS
HEIGHT: 60 IN | TEMPERATURE: 97.5 F | SYSTOLIC BLOOD PRESSURE: 145 MMHG | RESPIRATION RATE: 20 BRPM | WEIGHT: 220 LBS | BODY MASS INDEX: 43.19 KG/M2 | OXYGEN SATURATION: 95 % | HEART RATE: 99 BPM | DIASTOLIC BLOOD PRESSURE: 99 MMHG

## 2025-01-13 LAB
ATRIAL RATE (BPM): 114
B PARAPERT DNA SPEC QL NAA+PROBE: NOT DETECTED
B PERT.PT PRMT NPH QL NAA+NON-PROBE: NOT DETECTED
C PNEUM DNA NPH QL NAA+NON-PROBE: NOT DETECTED
FLUAV RNA NPH QL NAA+NON-PROBE: NOT DETECTED
FLUBV RNA NPH QL NAA+NON-PROBE: NOT DETECTED
HADV DNA NPH QL NAA+NON-PROBE: NOT DETECTED
HCG SERPL-ACNC: <3 MUNITS/ML
HCOV 229E RNA NPH QL NAA+NON-PROBE: NOT DETECTED
HCOV HKU1 RNA NPH QL NAA+NON-PROBE: NOT DETECTED
HCOV NL63 RNA NPH QL NAA+NON-PROBE: NOT DETECTED
HCOV OC43 RNA NPH QL NAA+NON-PROBE: NOT DETECTED
HMPV RNA NPH QL NAA+NON-PROBE: NOT DETECTED
HPIV1 RNA NPH QL NAA+NON-PROBE: NOT DETECTED
HPIV2 RNA NPH QL NAA+NON-PROBE: NOT DETECTED
HPIV3 RNA NPH QL NAA+NON-PROBE: NOT DETECTED
HPIV4 RNA NPH QL NAA+NON-PROBE: NOT DETECTED
M PNEUMO DNA NPH QL NAA+NON-PROBE: NOT DETECTED
P AXIS (DEGREES): 62
PR-INTERVAL (MSEC): 126
QRS-INTERVAL (MSEC): 82
QT-INTERVAL (MSEC): 314
QTC: 432
R AXIS (DEGREES): 17
REPORT TEXT: NORMAL
RSV RNA NPH QL NAA+NON-PROBE: NOT DETECTED
RV+EV RNA NPH QL NAA+NON-PROBE: NOT DETECTED
SARS-COV-2 RNA RESP QL NAA+PROBE: NOT DETECTED
T AXIS (DEGREES): 34
VENTRICULAR RATE EKG/MIN (BPM): 114

## 2025-01-13 PROCEDURE — 10002800 HB RX 250 W HCPCS: Performed by: EMERGENCY MEDICINE

## 2025-01-13 PROCEDURE — 74177 CT ABD & PELVIS W/CONTRAST: CPT

## 2025-01-13 PROCEDURE — 96376 TX/PRO/DX INJ SAME DRUG ADON: CPT

## 2025-01-13 PROCEDURE — 10002805 HB CONTRAST AGENT: Performed by: EMERGENCY MEDICINE

## 2025-01-13 PROCEDURE — 0202U NFCT DS 22 TRGT SARS-COV-2: CPT | Performed by: EMERGENCY MEDICINE

## 2025-01-13 PROCEDURE — 96361 HYDRATE IV INFUSION ADD-ON: CPT

## 2025-01-13 RX ORDER — ONDANSETRON 2 MG/ML
4 INJECTION INTRAMUSCULAR; INTRAVENOUS ONCE
Status: COMPLETED | OUTPATIENT
Start: 2025-01-13 | End: 2025-01-13

## 2025-01-13 RX ORDER — METOCLOPRAMIDE HYDROCHLORIDE 5 MG/ML
10 INJECTION INTRAMUSCULAR; INTRAVENOUS ONCE
Status: DISCONTINUED | OUTPATIENT
Start: 2025-01-13 | End: 2025-01-13

## 2025-01-13 RX ORDER — DICYCLOMINE HYDROCHLORIDE 10 MG/1
10 CAPSULE ORAL 4 TIMES DAILY
Qty: 40 CAPSULE | Refills: 0 | Status: SHIPPED | OUTPATIENT
Start: 2025-01-13 | End: 2025-01-23

## 2025-01-13 RX ORDER — ONDANSETRON 4 MG/1
4 TABLET, ORALLY DISINTEGRATING ORAL EVERY 6 HOURS
Qty: 10 TABLET | Refills: 0 | Status: SHIPPED | OUTPATIENT
Start: 2025-01-13

## 2025-01-13 RX ADMIN — ONDANSETRON 4 MG: 2 INJECTION INTRAMUSCULAR; INTRAVENOUS at 02:47

## 2025-01-13 RX ADMIN — ONDANSETRON 4 MG: 2 INJECTION INTRAMUSCULAR; INTRAVENOUS at 01:26

## 2025-01-13 RX ADMIN — IOHEXOL 105 ML: 350 INJECTION, SOLUTION INTRAVENOUS at 02:57

## 2025-07-14 DIAGNOSIS — R92.30 DENSE BREASTS: ICD-10-CM

## 2025-07-14 DIAGNOSIS — Z12.31 VISIT FOR SCREENING MAMMOGRAM: Primary | ICD-10-CM

## 2025-08-28 ENCOUNTER — HOSPITAL ENCOUNTER (OUTPATIENT)
Dept: MAMMOGRAPHY | Age: 40
Discharge: HOME | End: 2025-08-28
Attending: OBSTETRICS & GYNECOLOGY

## 2025-08-28 DIAGNOSIS — R92.30 DENSE BREASTS: ICD-10-CM

## 2025-08-28 DIAGNOSIS — Z12.31 VISIT FOR SCREENING MAMMOGRAM: ICD-10-CM

## 2025-08-28 PROCEDURE — 76641 ULTRASOUND BREAST COMPLETE: CPT

## 2025-08-28 PROCEDURE — 77067 SCR MAMMO BI INCL CAD: CPT

## (undated) DEVICE — GLOVE SURG 6 PROTEXIS LF CRM PF BEAD CUFF STRL PLISPRN 11.3

## (undated) DEVICE — NEEDLE INSFL 14GA 120MM STD SPRG LOAD BLUNT STY STRL LF DISP

## (undated) DEVICE — ADHESIVE PREMIERPRO EXOFIN 1ML HVISC TUBE SOFT FLXB APL

## (undated) DEVICE — ELECTRODE ESURG 10FT 2 DSPR ADH BRDR PULL TAB PREATTACH CBL

## (undated) DEVICE — SOLUTION ANTIFOG FLUID RADOPQ SOL SPNG LF

## (undated) DEVICE — KIT RM TURNOVER IC GN LF

## (undated) DEVICE — SHEARS ESURG 36CM 5MM HARMONIC ACE+ CRV TI 2 HNDCNTL BTN TPR

## (undated) DEVICE — PACK CUST GEN LAPAROSCOPY

## (undated) DEVICE — TROCAR LAPSCP STD 100MM 12MM VERSAONE SMTH CANNULA BLADE

## (undated) DEVICE — SUTURE MONOCRYL MTPS 4-0 PS2 18IN MONO ABS UNDYED

## (undated) DEVICE — SUTURE MONOCRYL 3-0 SH 27IN MONO ABS UNDYED

## (undated) DEVICE — GLOVE SURG 6.5 PROTEXIS PI LF CRM PF BEAD CUFF STRL PLISPRN

## (undated) DEVICE — ELECTRODE ESURG BLADE PNCL 10FT 38IN TUBE ADPR PORT

## (undated) DEVICE — GLOVE SURG 6.5 PROTEXIS LF BLUE PF SMTH BEAD CUFF INTLK STRL

## (undated) DEVICE — BLANKET WRM UPR BODY ADULT 74X24IN BR HGR PLMR 2 HOSE PORT

## (undated) DEVICE — DRAPE FLUID WARM SYS 62X56IN EQUIPMENT CORETEMP STRL

## (undated) DEVICE — 2% CHLORHEXIDINE SKIN PREP ORANGE 26ML

## (undated) DEVICE — TROCAR LAPSCP STD 100MM 5MM VERSAONE FX CANNULA BLADE STRL

## (undated) DEVICE — GLOVE SURG 8 GAMMEX LF NATURAL PF BEAD CUFF TXTR STRL

## (undated) DEVICE — SUTURE VICRYL 0 CT-3 27IN BRAID COAT ABS VIOL J329H

## (undated) DEVICE — POUCH SPEC RTRVL 34.5CM 29.5CM 10MM ERG HNDL LONG CYL TUBE

## (undated) DEVICE — GLOVE SURG 8 PROTEXIS PI LF CRM PF BEAD CUFF STRL PLISPRN

## (undated) DEVICE — CANISTER SCT 90D 2000ML DISP LF MDVC GUARDIAN LOCK LID OVFLW

## (undated) DEVICE — IRRIGATOR LAPSCP 33CM 5MM HYDRO-SURG + NEZHAT-DRSY SMKVC SCT

## (undated) DEVICE — SOLIDIFIER FLUID 14000CC WST LF

## (undated) DEVICE — LIGATOR ENDO 0 18IN MONO TIE LOOP SUT E-LP PDS2 LF VIOL